# Patient Record
Sex: MALE | Race: WHITE | NOT HISPANIC OR LATINO | Employment: FULL TIME | URBAN - METROPOLITAN AREA
[De-identification: names, ages, dates, MRNs, and addresses within clinical notes are randomized per-mention and may not be internally consistent; named-entity substitution may affect disease eponyms.]

---

## 2017-03-23 ENCOUNTER — OFFICE VISIT - HEALTHEAST (OUTPATIENT)
Dept: FAMILY MEDICINE | Facility: CLINIC | Age: 47
End: 2017-03-23

## 2017-03-23 DIAGNOSIS — Z87.442 HISTORY OF KIDNEY STONES: ICD-10-CM

## 2017-03-23 DIAGNOSIS — I10 ESSENTIAL HYPERTENSION: ICD-10-CM

## 2017-03-23 DIAGNOSIS — E66.3 OVERWEIGHT: ICD-10-CM

## 2017-03-23 DIAGNOSIS — Z00.00 WELL ADULT EXAM: ICD-10-CM

## 2017-03-23 DIAGNOSIS — L70.9 ACNE: ICD-10-CM

## 2017-03-23 LAB
CHOLEST SERPL-MCNC: 214 MG/DL
FASTING STATUS PATIENT QL REPORTED: YES
HDLC SERPL-MCNC: 64 MG/DL
LDLC SERPL CALC-MCNC: 111 MG/DL
PSA SERPL-MCNC: 0.5 NG/ML (ref 0–2.5)
TRIGL SERPL-MCNC: 196 MG/DL

## 2017-03-23 ASSESSMENT — MIFFLIN-ST. JEOR: SCORE: 1540.62

## 2018-05-24 ENCOUNTER — COMMUNICATION - HEALTHEAST (OUTPATIENT)
Dept: FAMILY MEDICINE | Facility: CLINIC | Age: 48
End: 2018-05-24

## 2018-06-21 ENCOUNTER — COMMUNICATION - HEALTHEAST (OUTPATIENT)
Dept: FAMILY MEDICINE | Facility: CLINIC | Age: 48
End: 2018-06-21

## 2018-06-25 ENCOUNTER — OFFICE VISIT - HEALTHEAST (OUTPATIENT)
Dept: FAMILY MEDICINE | Facility: CLINIC | Age: 48
End: 2018-06-25

## 2018-06-25 DIAGNOSIS — L70.9 ACNE: ICD-10-CM

## 2018-06-25 DIAGNOSIS — Z12.12 ENCOUNTER FOR COLORECTAL CANCER SCREENING: ICD-10-CM

## 2018-06-25 DIAGNOSIS — E78.1 HYPERTRIGLYCERIDEMIA: ICD-10-CM

## 2018-06-25 DIAGNOSIS — I10 ESSENTIAL HYPERTENSION: ICD-10-CM

## 2018-06-25 DIAGNOSIS — Z12.11 ENCOUNTER FOR COLORECTAL CANCER SCREENING: ICD-10-CM

## 2018-06-25 DIAGNOSIS — Z00.00 ENCOUNTER FOR GENERAL ADULT MEDICAL EXAMINATION WITHOUT ABNORMAL FINDINGS: ICD-10-CM

## 2018-06-25 LAB
ANION GAP SERPL CALCULATED.3IONS-SCNC: 9 MMOL/L (ref 5–18)
BUN SERPL-MCNC: 15 MG/DL (ref 8–22)
CALCIUM SERPL-MCNC: 9.9 MG/DL (ref 8.5–10.5)
CHLORIDE BLD-SCNC: 107 MMOL/L (ref 98–107)
CHOLEST SERPL-MCNC: 190 MG/DL
CO2 SERPL-SCNC: 26 MMOL/L (ref 22–31)
CREAT SERPL-MCNC: 0.87 MG/DL (ref 0.7–1.3)
FASTING STATUS PATIENT QL REPORTED: YES
GFR SERPL CREATININE-BSD FRML MDRD: >60 ML/MIN/1.73M2
GLUCOSE BLD-MCNC: 101 MG/DL (ref 70–125)
HDLC SERPL-MCNC: 59 MG/DL
LDLC SERPL CALC-MCNC: 103 MG/DL
POTASSIUM BLD-SCNC: 5.1 MMOL/L (ref 3.5–5)
SODIUM SERPL-SCNC: 142 MMOL/L (ref 136–145)
TRIGL SERPL-MCNC: 142 MG/DL

## 2018-06-25 ASSESSMENT — MIFFLIN-ST. JEOR: SCORE: 1544.07

## 2018-08-02 ENCOUNTER — RECORDS - HEALTHEAST (OUTPATIENT)
Dept: ADMINISTRATIVE | Facility: OTHER | Age: 48
End: 2018-08-02

## 2018-08-13 ENCOUNTER — COMMUNICATION - HEALTHEAST (OUTPATIENT)
Dept: FAMILY MEDICINE | Facility: CLINIC | Age: 48
End: 2018-08-13

## 2019-09-17 ENCOUNTER — OFFICE VISIT - HEALTHEAST (OUTPATIENT)
Dept: FAMILY MEDICINE | Facility: CLINIC | Age: 49
End: 2019-09-17

## 2019-09-17 DIAGNOSIS — I10 ESSENTIAL HYPERTENSION: ICD-10-CM

## 2019-09-17 DIAGNOSIS — Z13.220 SCREENING CHOLESTEROL LEVEL: ICD-10-CM

## 2019-09-17 DIAGNOSIS — Z00.00 ENCOUNTER FOR GENERAL ADULT MEDICAL EXAMINATION WITHOUT ABNORMAL FINDINGS: ICD-10-CM

## 2019-09-17 DIAGNOSIS — Z12.5 SCREENING FOR PROSTATE CANCER: ICD-10-CM

## 2019-09-17 DIAGNOSIS — L70.0 ACNE VULGARIS: ICD-10-CM

## 2019-09-17 LAB
ANION GAP SERPL CALCULATED.3IONS-SCNC: 8 MMOL/L (ref 5–18)
BUN SERPL-MCNC: 13 MG/DL (ref 8–22)
CALCIUM SERPL-MCNC: 10.1 MG/DL (ref 8.5–10.5)
CHLORIDE BLD-SCNC: 104 MMOL/L (ref 98–107)
CHOLEST SERPL-MCNC: 199 MG/DL
CO2 SERPL-SCNC: 30 MMOL/L (ref 22–31)
CREAT SERPL-MCNC: 0.89 MG/DL (ref 0.7–1.3)
FASTING STATUS PATIENT QL REPORTED: YES
GFR SERPL CREATININE-BSD FRML MDRD: >60 ML/MIN/1.73M2
GLUCOSE BLD-MCNC: 95 MG/DL (ref 70–125)
HDLC SERPL-MCNC: 60 MG/DL
LDLC SERPL CALC-MCNC: 105 MG/DL
POTASSIUM BLD-SCNC: 4.6 MMOL/L (ref 3.5–5)
PSA SERPL-MCNC: 0.5 NG/ML (ref 0–3.5)
SODIUM SERPL-SCNC: 142 MMOL/L (ref 136–145)
TRIGL SERPL-MCNC: 169 MG/DL

## 2019-09-17 RX ORDER — TRETINOIN 1 MG/G
CREAM TOPICAL AT BEDTIME
Qty: 45 G | Refills: 3 | Status: SHIPPED | OUTPATIENT
Start: 2019-09-17 | End: 2022-02-08

## 2019-09-17 ASSESSMENT — MIFFLIN-ST. JEOR: SCORE: 1592.45

## 2021-02-08 ENCOUNTER — COMMUNICATION - HEALTHEAST (OUTPATIENT)
Dept: FAMILY MEDICINE | Facility: CLINIC | Age: 51
End: 2021-02-08

## 2021-05-30 VITALS — HEIGHT: 67 IN | BODY MASS INDEX: 25.33 KG/M2 | WEIGHT: 161.38 LBS

## 2021-06-01 VITALS — HEIGHT: 67 IN | WEIGHT: 163.19 LBS | BODY MASS INDEX: 25.61 KG/M2

## 2021-06-01 NOTE — PATIENT INSTRUCTIONS - HE
Firearm Safety  From 2005 to 2014, roughly 20,000 [place] minors were killed or seriously injured in accidental shootings; the majority of those killed in these tragic accidents were aged 12 or younger. As such the American Academy of Pediatrics, American Academy of Family Physicians, American College of Physicians and the National Rifle Association all recommend preventing unintentional use of firearms by securely storing firearms in locked safe or at minimum a trigger lock.

## 2021-06-03 VITALS
OXYGEN SATURATION: 98 % | RESPIRATION RATE: 14 BRPM | SYSTOLIC BLOOD PRESSURE: 128 MMHG | DIASTOLIC BLOOD PRESSURE: 68 MMHG | WEIGHT: 172.8 LBS | HEIGHT: 67 IN | HEART RATE: 68 BPM | BODY MASS INDEX: 27.12 KG/M2

## 2021-06-09 NOTE — PROGRESS NOTES
Assessment:      Healthy male exam.    1. Well adult exam  Lipid Cascade    PSA (Prostatic-Specific Antigen), Annual Screen   2. Essential hypertension  Basic Metabolic Panel   3. Acne     4. History of kidney stones     5. Overweight       The following high BMI interventions were performed this visit: encouragement to exercise     Plan:      I encouraged him to continue exercising and eating a healthy diet.  We are going to check the above labs today.  His blood pressure and acne are currently well controlled on his medications.  He was given refills today.     All questions answered.  Await PSA results.  Discussed healthy lifestyle modifications.  Follow up as needed.     Subjective:      oJno Bearden is a 46 y.o. male who presents for an annual exam. The patient reports that there is not domestic violence in his life.  He denies having concerns regarding hearing, vision, urination, bowel movements, sleep or mood.  He has a history of hypertension which is well controlled with low-dose of lisinopril.  He reports being on a higher dose, but his blood pressure improved with weight loss.  He also has a history of acne which is well controlled with tretinoin.  He describes having a history of microscopic hematuria that was worked up through urology and felt to be secondary to kidney stones.    Social history: Single, no children.  He works as a .      Healthy Habits:   Regular Exercise: Yes  Sunscreen Use: Yes  Healthy Diet: Yes  Dental Visits Regularly: Yes  Seat Belt: Yes  Sexually active: No  Monthly Self Testicular Exams:  No  Hemoccults: No  Flex Sig: No  Colonoscopy: No  Lipid Profile: Yes last year per pt.  Glucose Screen: yes  Prevention of Osteoporosis: No  Last Dexa: N/A  Guns at Home:  N/A      Immunization History   Administered Date(s) Administered     Influenza, inj, historic 01/05/2016     Td, historic 08/22/2005     Tdap 06/30/2014     Immunization status: up to date and documented.    No  "exam data present    Current Outpatient Prescriptions   Medication Sig Dispense Refill     lisinopril (PRINIVIL,ZESTRIL) 5 MG tablet Take 1 tablet (5 mg total) by mouth daily. 90 tablet 3     tretinoin (RETIN-A) 0.1 % cream Apply topically bedtime. 45 g 3     No current facility-administered medications for this visit.      No past medical history on file.  No past surgical history on file.  Ampicillin  Family History   Problem Relation Age of Onset     Dementia Mother      Hypertension Mother      No Medical Problems Father      Breast cancer Maternal Aunt      Breast cancer Maternal Grandmother      Heart disease Maternal Grandfather      Heart disease Paternal Grandfather      Social History     Social History     Marital status: Single     Spouse name: N/A     Number of children: N/A     Years of education: N/A     Occupational History     Not on file.     Social History Main Topics     Smoking status: Never Smoker     Smokeless tobacco: Never Used     Alcohol use Yes      Comment: moderate     Drug use: No     Sexual activity: Not on file     Other Topics Concern     Not on file     Social History Narrative     No narrative on file       Review of Systems  General:  Denies problem  Eyes: Denies problem  Ears/Nose/Throat: Denies problem  Cardiovascular: Denies problem  Respiratory:  Denies problem  Gastrointestinal:  Denies problem  Genitourinary: Denies problem  Musculoskeletal:  Denies problem  Skin: Denies problem  Neurologic: Denies problem  Psychiatric: Denies problem  Endocrine: Denies problem  Heme/Lymphatic: Denies problem   Allergic/Immunologic: Denies problem        Objective:     Vitals:    03/23/17 0801   BP: 114/76   Pulse: 64   Resp: 16   Temp: 97.9  F (36.6  C)   TempSrc: Oral   Weight: 161 lb 6 oz (73.2 kg)   Height: 5' 7\" (1.702 m)     Body mass index is 25.27 kg/(m^2).    Physical  General Appearance: Alert, cooperative, no distress, appears stated age  Head: Normocephalic, without obvious " abnormality, atraumatic  Eyes: PERRL, conjunctiva/corneas clear, EOM's intact  Ears: Normal TM's and external ear canals, both ears  Nose: Nares normal, septum midline,mucosa normal, no drainage  Throat: Lips, mucosa, and tongue normal; teeth and gums normal  Neck: Supple, symmetrical, trachea midline, no adenopathy;  thyroid: not enlarged, symmetric, no tenderness/mass/nodules  Back: Symmetric, no curvature, ROM normal, no CVA tenderness  Lungs: Clear to auscultation bilaterally, respirations unlabored  Heart: Regular rate and rhythm, S1 and S2 normal, no murmur, rub, or gallop, organomegaly  Genitourinary: Penis normal. Right testis is descended. Left testis is descended.  no hernias palpated  Abdomen: Soft, non-tender, bowel sounds active all four quadrants,  no masses  Musculoskeletal: Normal range of motion. No joint swelling or deformity.   Extremities: Extremities normal, atraumatic, no cyanosis or edema  Skin: Skin color, texture, turgor normal, no rashes.  There are a few benign-appearing moles scattered on his back.  Lymph nodes: Cervical, supraclavicular, and axillary nodes normal  Neurologic: He is alert. He has normal reflexes.   Psychiatric: He has a normal mood and affect.

## 2021-06-15 PROBLEM — Z87.442 HISTORY OF KIDNEY STONES: Status: ACTIVE | Noted: 2017-03-23

## 2021-06-15 PROBLEM — L70.9 ACNE: Status: ACTIVE | Noted: 2017-03-23

## 2021-06-15 PROBLEM — I10 ESSENTIAL HYPERTENSION: Status: ACTIVE | Noted: 2017-03-23

## 2021-06-15 PROBLEM — E66.3 OVERWEIGHT: Status: ACTIVE | Noted: 2017-03-23

## 2021-06-18 NOTE — PROGRESS NOTES
MALE PREVENTATIVE EXAM    Assessment and Plan:       1. Encounter for general adult medical examination without abnormal findings  He appears to be in good health.  I encouraged him to keep exercising and eating a healthy diet.    2. Essential hypertension  Currently very well controlled on low-dose lisinopril.  We decided to stop this medication.  He will continue to monitor his blood pressure in the community.  If the systolic blood pressure starts running in the 130s or higher he will return to the clinic for reevaluation.  - Basic Metabolic Panel    3. Acne  Stable on tretinoin    4. Hypertriglyceridemia  Discussed dietary modifications that can help with this.  - Lipid Cascade    5. Encounter for colorectal cancer screening  - Cologuard     Next follow up:  No Follow-up on file.    Immunization Review  Adult Imm Review: No immunizations due today      I discussed the following with the patient:   Adult Healthy Living: Importance of regular exercise  Healthy nutrition        Subjective:   Chief Complaint: Jono Bearden is an 47 y.o. male here for a preventative health visit.     HPI: He denies having any concerns regarding hearing, vision, urination, bowel movements, sleep or mood.  He is a history of hypertension and has been on lisinopril.  Last year we cut the dose down to 5 mg daily because his blood pressure was under good control as he has been losing weight.  He has lost about 20 pounds in the past 3 years.  When he checks his blood pressure in the community his systolic is usually in the low 100s.  He is trying to eat healthy diet and get regular exercise.  He continues to use tretinoin for acne.  Last year his triglycerides were elevated at 196, .  He had a normal BMP and PSA.    Social history: Single, no children.  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 No  Do you typically exercising at least 40 min, 3-4 times per week?  Yes  Do you  "usually eat at least 4 servings of fruit and vegetables a day, include whole grains and fiber and avoid regularly eating high fat foods? Yes  Have you had an eye exam in the past two years? NO  Do you see a dentist twice per year? Yes  Do you have any concerns regarding sleep? No    Safety Screen  If you own firearms, are they secured in a locked gun cabinet or with trigger locks? The patient does not own any firearms  Do you feel you are safe where you are living?: Yes (6/25/2018  7:57 AM)  Do you feel you are safe in your relationship(s)?: Yes (6/25/2018  7:57 AM)    Review of Systems:  Please see above.  The rest of the review of systems are negative for all systems.     Cancer Screening     Patient has no health maintenance due at this time              History     Reviewed By Date/Time Sections Reviewed    Shantel Ortiz CMA 6/25/2018  7:57 AM Tobacco            Objective:   Vital Signs:   Visit Vitals     /72 (Patient Site: Left Arm, Patient Position: Sitting, Cuff Size: Adult Regular)     Pulse (!) 52     Temp 98.5  F (36.9  C) (Oral)     Resp 16     Ht 5' 6.7\" (1.694 m)     Wt 163 lb 3 oz (74 kg)     BMI 25.79 kg/m2          PHYSICAL EXAM  General Appearance: Alert, cooperative, no distress, appears stated age  Head: Normocephalic, without obvious abnormality, atraumatic  Eyes: PERRL, conjunctiva/corneas clear, EOM's intact  Ears: Normal TM's and external ear canals, both ears  Nose: Nares normal, septum midline,mucosa normal, no drainage  Throat: Lips, mucosa, and tongue normal; teeth and gums normal  Neck: Supple, symmetrical, trachea midline, no adenopathy;  thyroid: not enlarged, symmetric, no tenderness/mass/nodules  Back: Symmetric, no curvature, ROM normal, no CVA tenderness  Lungs: Clear to auscultation bilaterally, respirations unlabored  Heart: Regular rate and rhythm, S1 and S2 normal, no murmur, rub, or gallop,  Abdomen: Soft, non-tender, bowel sounds active all four quadrants,  no " masses, no organomegaly  Musculoskeletal: Normal range of motion. No joint swelling or deformity.   Extremities: Extremities normal, atraumatic, no cyanosis or edema  Skin: Skin color, texture, turgor normal, no rashes or lesions  Lymph nodes: Cervical, supraclavicular, and axillary nodes normal  Neurologic: He is alert.  Normal speech.  No focal deficits.  Normal deep tendon reflexes.   Psychiatric: He has a normal mood and affect.              Medication List          These changes are accurate as of 6/25/18  8:28 AM.  If you have any questions, ask your nurse or doctor.               START taking these medications          tretinoin 0.1 % cream   Also known as:  RETIN-A   INSTRUCTIONS:  Apply topically at bedtime.   Started by:  Pilo Godwin, DO             STOP taking these medications          lisinopril 5 MG tablet   Also known as:  PRINIVIL,ZESTRIL   Stopped by:  Pilo Godwin, DO            Where to Get Your Medications      You can get these medications from any pharmacy     Bring a paper prescription for each of these medications      tretinoin 0.1 % cream             Additional Screenings Completed Today:

## 2021-06-27 ENCOUNTER — HEALTH MAINTENANCE LETTER (OUTPATIENT)
Age: 51
End: 2021-06-27

## 2021-06-28 NOTE — PROGRESS NOTES
Progress Notes by Pilo Colbert DO at 9/17/2019 10:40 AM     Author: Pilo Colbert DO Service: -- Author Type: Physician    Filed: 9/17/2019 11:15 AM Encounter Date: 9/17/2019 Status: Signed    : Pilo Colbert DO (Physician)       MALE PREVENTATIVE EXAM    Assessment and Plan:       1. Encounter for general adult medical examination without abnormal findings  I encouraged him to get regular exercise and to eat a healthy diet.  We are going to check fasting labs today.    2. Acne vulgaris  Controlled with tretinoin.  Refills were given.  - tretinoin (RETIN-A) 0.1 % cream; Apply topically at bedtime.  Dispense: 45 g; Refill: 3    3. Essential hypertension  His blood pressure is well controlled since stopping lisinopril last year.  He will continue to get regular exercise and eat a healthy diet.  - Basic Metabolic Panel    4. Screening cholesterol level  - Lipid Cascade    5. Screening for prostate cancer  - PSA (Prostatic-Specific Antigen), Annual Screen     Next follow up:  No follow-ups on file.    Immunization Review  Adult Imm Review: Due today, orders placed      I discussed the following with the patient:   Adult Healthy Living: Importance of regular exercise  Healthy nutrition        Subjective:   Chief Complaint: Jono Bearden is an 49 y.o. male here for a preventative health visit.     HPI: He denies concerns regarding hearing, vision, urination, bowel movements, sleep or mood.  Last year we stopped lisinopril since his blood pressure has been under such good control.  He continues to exercise regularly and eat healthy foods.  Last year his potassium level was slightly elevated at 5.1.  This could have been from the lisinopril which he is no longer on.  His LDL cholesterol was 103.    Social history: Single.  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    Healthy Habits  Are you taking a daily aspirin? No  Do you typically exercising at least 40 min,  3-4 times per week?  NO  Do you usually eat at least 4 servings of fruit and vegetables a day, include whole grains and fiber and avoid regularly eating high fat foods? Yes  Have you had an eye exam in the past two years? NO  Do you see a dentist twice per year? Yes  Do you have any concerns regarding sleep? No    Safety Screen  If you own firearms, are they secured in a locked gun cabinet or with trigger locks? NO  Do you feel you are safe where you are living?: Yes (9/17/2019 10:34 AM)  Do you feel you are safe in your relationship(s)?: Yes (9/17/2019 10:34 AM)      Review of Systems:  Please see above.  The rest of the review of systems are negative for all systems.     Cancer Screening       Status Date      COLOGUARD Next Due 8/3/2021      Done 8/3/2018 Negative     Patient has more history with this topic...              History     Reviewed By Date/Time Sections Reviewed    Odilia Prado LPN 9/17/2019 10:34 AM Tobacco            Objective:   Vital Signs: There were no vitals taken for this visit.       PHYSICAL EXAM  General Appearance: Alert, cooperative, no distress, appears stated age  Head: Normocephalic, without obvious abnormality, atraumatic  Eyes: PERRL, conjunctiva/corneas clear, EOM's intact  Ears: Normal TM's and external ear canals, both ears  Nose: Nares normal, septum midline,mucosa normal, no drainage  Throat: Lips, mucosa, and tongue normal; teeth and gums normal  Neck: Supple, symmetrical, trachea midline, no adenopathy;  thyroid: not enlarged, symmetric, no tenderness/mass/nodules  Back: Symmetric, no curvature, ROM normal, no CVA tenderness  Lungs: Clear to auscultation bilaterally, respirations unlabored  Heart: Regular rate and rhythm, S1 and S2 normal, no murmur, rub, or gallop,  Abdomen: Soft, non-tender, bowel sounds active all four quadrants,  no masses, no organomegaly  Musculoskeletal: Normal range of motion. No joint swelling or deformity.   Extremities: Extremities normal,  atraumatic, no cyanosis or edema  Skin: Skin color, texture, turgor normal, no rashes or lesions  Lymph nodes: Cervical, supraclavicular, and axillary nodes normal  Neurologic: He is alert.  Normal speech.  No focal deficits.  Normal deep tendon reflexes.   Psychiatric: He has a normal mood and affect.                Medication List           Accurate as of 9/17/19 11:15 AM. If you have any questions, ask your nurse or doctor.               CONTINUE taking these medications    tretinoin 0.1 % cream  Also known as:  RETIN-A  INSTRUCTIONS:  Apply topically at bedtime.              Where to Get Your Medications      These medications were sent to [place] [id number] [place] [address] [address]    Phone:  [phone]     tretinoin 0.1 % cream         Additional Screenings Completed Today:

## 2021-10-17 ENCOUNTER — HEALTH MAINTENANCE LETTER (OUTPATIENT)
Age: 51
End: 2021-10-17

## 2022-01-21 ENCOUNTER — ANCILLARY PROCEDURE (OUTPATIENT)
Dept: GENERAL RADIOLOGY | Facility: CLINIC | Age: 52
End: 2022-01-21
Attending: NURSE PRACTITIONER
Payer: COMMERCIAL

## 2022-01-21 ENCOUNTER — OFFICE VISIT (OUTPATIENT)
Dept: URGENT CARE | Facility: URGENT CARE | Age: 52
End: 2022-01-21
Payer: COMMERCIAL

## 2022-01-21 VITALS
TEMPERATURE: 100 F | HEART RATE: 87 BPM | HEIGHT: 67 IN | OXYGEN SATURATION: 96 % | DIASTOLIC BLOOD PRESSURE: 94 MMHG | WEIGHT: 174 LBS | RESPIRATION RATE: 20 BRPM | SYSTOLIC BLOOD PRESSURE: 182 MMHG | BODY MASS INDEX: 27.31 KG/M2

## 2022-01-21 DIAGNOSIS — S92.354A CLOSED NONDISPLACED FRACTURE OF FIFTH METATARSAL BONE OF RIGHT FOOT, INITIAL ENCOUNTER: Primary | ICD-10-CM

## 2022-01-21 DIAGNOSIS — S90.31XA CONTUSION OF RIGHT FOOT, INITIAL ENCOUNTER: ICD-10-CM

## 2022-01-21 DIAGNOSIS — M79.671 RIGHT FOOT PAIN: ICD-10-CM

## 2022-01-21 DIAGNOSIS — I10 SEVERE UNCONTROLLED HYPERTENSION: ICD-10-CM

## 2022-01-21 LAB
ANION GAP SERPL CALCULATED.3IONS-SCNC: 5 MMOL/L (ref 3–14)
BUN SERPL-MCNC: 15 MG/DL (ref 7–30)
CALCIUM SERPL-MCNC: 9.1 MG/DL (ref 8.5–10.1)
CHLORIDE BLD-SCNC: 103 MMOL/L (ref 94–109)
CO2 SERPL-SCNC: 28 MMOL/L (ref 20–32)
CREAT SERPL-MCNC: 0.96 MG/DL (ref 0.66–1.25)
GFR SERPL CREATININE-BSD FRML MDRD: >90 ML/MIN/1.73M2
GLUCOSE BLD-MCNC: 140 MG/DL (ref 70–99)
POTASSIUM BLD-SCNC: 4.2 MMOL/L (ref 3.4–5.3)
SODIUM SERPL-SCNC: 136 MMOL/L (ref 133–144)

## 2022-01-21 PROCEDURE — 80048 BASIC METABOLIC PNL TOTAL CA: CPT | Performed by: NURSE PRACTITIONER

## 2022-01-21 PROCEDURE — 73630 X-RAY EXAM OF FOOT: CPT | Mod: RT | Performed by: RADIOLOGY

## 2022-01-21 PROCEDURE — 99215 OFFICE O/P EST HI 40 MIN: CPT | Performed by: NURSE PRACTITIONER

## 2022-01-21 PROCEDURE — 36415 COLL VENOUS BLD VENIPUNCTURE: CPT | Performed by: NURSE PRACTITIONER

## 2022-01-21 RX ORDER — LISINOPRIL 5 MG/1
5 TABLET ORAL DAILY
Qty: 30 TABLET | Refills: 0 | Status: SHIPPED | OUTPATIENT
Start: 2022-01-21 | End: 2022-03-04

## 2022-01-21 ASSESSMENT — MIFFLIN-ST. JEOR: SCORE: 1592.89

## 2022-01-21 NOTE — PROGRESS NOTES
Chief Complaint   Patient presents with     Urgent Care     Foot Injury     R foot injury today at [facility] noon heard a pop sound         ICD-10-CM    1. Closed nondisplaced fracture of fifth metatarsal bone of right foot, initial encounter  S92.354A Crutches Order for DME - ONLY FOR DME   2. Severe uncontrolled hypertension  I10 Basic metabolic panel  (Ca, Cl, CO2, Creat, Gluc, K, Na, BUN)     lisinopril (ZESTRIL) 5 MG tablet     Basic metabolic panel  (Ca, Cl, CO2, Creat, Gluc, K, Na, BUN)   3. Right foot pain  M79.671 XR Foot Right G/E 3 Views   4. Contusion of right foot, initial encounter  S90.31XA XR Foot Right G/E 3 Views     Patient was on blood pressure medications until approximately 2 years ago when his primary care provider took him off the medication because his blood pressure had normalized.  He was taking lisinopril at the time and tolerated this medication well.  We will check his basic metabolic panel today to be sure his kidney function is okay.  Patient is instructed not to start lisinopril until he receives verification of his normal labs.  He will check blood pressure twice a day and keep a log of this for his primary care provider if his systolic blood pressure goes over 200 again or diastolic over 120 he is instructed to go to the emergency room.  He is also instructed to make a follow-up appointment with his primary care provider as soon as possible to discuss hypertension.    Patient is fit with a knee-high boot and advised no weightbearing for the next 3 weeks.  We do not have crutches available at the urgent care at this time so Rx is given for him to pick them up with the assistance of a friend.  After 3 weeks he may begin gently bearing weight and continue wearing boot for a total of 6 weeks.  He will follow-up with orthopedics if he is having continuing pain after a couple of weeks or has any other concerns.    48 minutes spent on the date of the encounter doing chart review, history and  "exam, documentation and further activities per the note    Xray - Reviewed and interpreted by me.  Right foot x-ray shows a proximal 5th metatarsal fracture with minimal displacement.    Subjective     Jono Bearden is an 51 year old male who presents to clinic today for right foot pain since slipping off a step and coming down onto the outside of the foot, near the 5th metacartarsal.       ROS: 10 point ROS neg other than the symptoms noted above in the HPI.       Objective    BP (!) 182/94   Pulse 87   Temp 100  F (37.8  C) (Oral)   Resp 20   Ht 1.702 m (5' 7\")   Wt 78.9 kg (174 lb)   SpO2 96%   BMI 27.25 kg/m    Nurses notes and VS have been reviewed.    Physical Exam       GENERAL APPEARANCE: healthy appearing, alert     RESP: lungs clear to auscultation - no rales, rhonchi or wheezes     CV: regular rates and rhythm, no murmurs, rubs, or gallop     MS: extremities normal- no gross deformities noted; normal muscle tone, except for tenderness over the right 5th metatarsal area which also has slight ecchymosis and swelling.     SKIN: no suspicious lesions or rashes     NEURO: Normal strength and tone, mentation intact and speech normal     PSYCH: normal thought process; no significant mood disturbance    Patient Instructions   Wear boot with no weight bearing for 3 weeks, then may start weight bearing. Wear boot for a total of 6 weeks.    Acetaminophen (Tylenol) may be taken up to 4000mg daily (3000mg if over 65) which would be 2 regular strength tablets (325mg) or two extra strength tablets(500mg) up to 4 times a day (3 times a day if over 65).   Check for acetaminophen in other OTC or prescription medications and be sure you add this in the maximum amount you take every day.    Too much acetaminophen can lead to serious liver damage. DO NOT TAKE if you have a history of liver disease.    Ibuprofen 200mg tablets may be taken up to 4 pills 4 times a day(three times a day if over 65)  to the maximum of " 3200mg,  (2400mg if >65)  daily as needed for pain.   Take with food.  Don't take with aspirin, Aleve or other antiinflammatory medication or with warfarin. DO NOT TAKE if you have a history of kidney disease.    Ice for 20 minutes every couple hours when able, keep foot elevated is much as possible.      Patient Education     Understanding Fifth Metatarsal Fracture    A fifth metatarsal fracture is a type of broken bone in your foot. You have 5 metatarsals. They are the middle bones in your feet, between your toes and your ankle bones (tarsals). The fifth metatarsal connects your smallest toe to your ankle. These bones help with arch support and balance.   How to say it   [id number]  What causes a fifth metatarsal fracture?   A direct blow to the bone is often the cause of a fracture of the fifth metatarsal. That may happen if you drop a heavy object on your foot or land wrong on your foot or ankle. Twisting activities can also break the bone. Pivoting while playing basketball is one example.   Repeatedly placing too much stress on the bone can also cause a fracture of the fifth metatarsal. This is called a stress fracture. People who do physical activities like dancing or running tend to be more prone to stress fractures.   Symptoms of a fifth metatarsal fracture   Sudden pain along the outside of your foot is the main symptom. A stress fracture may develop more slowly. You may feel chronic pain for a period of time. Your foot may also swell up and bruise. You may have trouble walking.   Treatment for a fifth metatarsal fracture   Treatment for this type of fracture depends on where the bone is broken and how severe the break is. Healing can take up to several months. Treatment may include:     Cold therapy. Putting ice on the area may reduce swelling and pain, especially in the first few days after injury.    Elevation. Propping up the foot so it's above the level of your heart may ease  swelling.    Prescription or over-the-counter pain medicines. These help reduce pain and swelling. Talk with your healthcare provider before taking any.    Immobilization. Devices such as a splint, cast, or walking boot can protect the bone and ease pain. They can help keep the bone in place so it heals properly. You may need to avoid putting any weight on the broken bone for a period of time. Severe fractures usually need a longer limit on weight-bearing activities.    Stretching and strengthening exercises. Certain exercises can help you regain flexibility and strength in your foot.    Surgery. You usually won't need surgery. But you may need it if the bone is broken into 2 or more pieces and is not aligned (displaced), doesn t heal properly, or takes a long time to heal.  Possible complications of a fifth metatarsal fracture     The bone doesn t heal correctly    Acute compartment syndrome. This is when pressure builds up in the muscles of the foot and affects blood flow.    When to call your healthcare provider  Call your healthcare provider right away if you have any of these:     Fever of 100.4 F (38 C) or higher, or as directed by your provider    Chills    Symptoms that don t get better, or get worse    Numbness or coldness in your foot    Toe nails that turn blue or grey in color    New symptoms  [employer] last reviewed this educational content on 8/1/2020 2000-2021 [employer]. All rights reserved. This information is not intended as a substitute for professional medical care. Always follow your healthcare professional's instructions.           Patient Education     Controlling High Blood Pressure   High blood pressure (hypertension) is often called the silent killer. This is because many people who have it, don t know it. It can be very dangerous. High blood pressure can raise your risk of heart attack, stroke, heart disease, and heart failure. Controlling your blood pressure can decrease  your risk of these problems. It's important to know the appropriate blood pressure range and remember to check your blood pressure regularly. Doing so can save your life.   Blood pressure measurements are given as 2 numbers. Systolic blood pressure is the upper number. This is the pressure when the heart contracts. Diastolic blood pressure is the lower number. This is the pressure when the heart relaxes between beats.   Blood pressure is categorized as normal, elevated, or stage 1 or stage 2 high blood pressure:     Normal blood pressure is systolic of less than 120 and diastolic of less than 80 (120/80)    Elevated blood pressure is systolic of 120 to 129 and diastolic less than 80    Stage 1 high blood pressure is systolic of 130 to 139 or diastolic between 80 to 89    Stage 2 high blood pressure is when systolic is 140 or higher or the diastolic is 90 or higher  A heart-healthy lifestyle can help you control your blood pressure without medicines. Here are some things you can do to pursue a heart-healthy lifestyle:     Choose heart-healthy foods     Select low-salt, low-fat foods. Limit sodium intake to 2,400 mg per day or the amount suggested by your healthcare provider.    Limit canned, dried, cured, packaged, and fast foods. These can contain a lot of salt.    Eat 8 to 10 servings of fruits and vegetables every day.    Choose lean meats, fish, or chicken.    Eat whole-grain pasta, brown rice, and beans.    Eat 2 to 3 servings of low-fat or fat-free dairy products.    Ask your doctor about the DASH eating plan. This plan helps reduce blood pressure.    When you go to a restaurant, ask that your meal be prepared with no added salt.    Stay at a healthy weight     Ask your healthcare provider how many calories to eat a day. Then stick to that number.    Ask your healthcare provider what weight range is healthiest for you. If you are overweight, a weight loss of only 3% to 5% of your body weight can help lower  blood pressure. Generally, a good weight loss goal is to lose 10% of your body weight in a year.    Limit snacks and sweets.    Get regular exercise.    Get up and get active     Find activities you enjoy that can be done alone or with friends or family. Such activities might include bicycling, dancing, walking, or jogging.    Park farther away from building entrances to walk more.    Use stairs instead of the elevator.    When you can, walk or bike instead of driving.    [place] leaves, garden, or do household repairs.    Be active at a moderate to vigorous level of physical activity for at least 40 minutes for a minimum of 3 to 4 days a week.     Manage stress     Make time to relax and enjoy life. Find time to laugh.    Communicate your concerns with your loved ones and your healthcare provider.    Visit with family and friends, and keep up with hobbies.    Limit alcohol and quit smoking     Men should have no more than 2 drinks per day.    Women should have no more than 1 drink per day.    Talk with your healthcare provider about quitting smoking. Smoking significantly increases your risk for heart disease and stroke. Ask your healthcare provider about community smoking cessation programs and other options.    Medicines  If lifestyle changes aren t enough, your healthcare provider may prescribe high blood pressure medicine. Take all medicines as prescribed. If you have any questions about your medicines, ask your healthcare provider before stopping or changing them.   [employer] last reviewed this educational content on 6/1/2019 2000-2021 [employer]. All rights reserved. This information is not intended as a substitute for professional medical care. Always follow your healthcare professional's instructions.               SUZETTE Arenas, CNP  [facility] Provider    The use of Dragon/[employer] dictation services may have been used to construct the content in this note; any grammatical or  spelling errors are non-intentional. Please contact the author of this note directly if you are in need of any clarification.

## 2022-01-22 NOTE — PATIENT INSTRUCTIONS
Wear boot with no weight bearing for 3 weeks, then may start weight bearing. Wear boot for a total of 6 weeks.    Acetaminophen (Tylenol) may be taken up to 4000mg daily (3000mg if over 65) which would be 2 regular strength tablets (325mg) or two extra strength tablets(500mg) up to 4 times a day (3 times a day if over 65).   Check for acetaminophen in other OTC or prescription medications and be sure you add this in the maximum amount you take every day.    Too much acetaminophen can lead to serious liver damage. DO NOT TAKE if you have a history of liver disease.    Ibuprofen 200mg tablets may be taken up to 4 pills 4 times a day(three times a day if over 65)  to the maximum of 3200mg,  (2400mg if >65)  daily as needed for pain.   Take with food.  Don't take with aspirin, Aleve or other antiinflammatory medication or with warfarin. DO NOT TAKE if you have a history of kidney disease.    Ice for 20 minutes every couple hours when able, keep foot elevated is much as possible.      Patient Education     Understanding Fifth Metatarsal Fracture    A fifth metatarsal fracture is a type of broken bone in your foot. You have 5 metatarsals. They are the middle bones in your feet, between your toes and your ankle bones (tarsals). The fifth metatarsal connects your smallest toe to your ankle. These bones help with arch support and balance.   How to say it   [id number]  What causes a fifth metatarsal fracture?   A direct blow to the bone is often the cause of a fracture of the fifth metatarsal. That may happen if you drop a heavy object on your foot or land wrong on your foot or ankle. Twisting activities can also break the bone. Pivoting while playing basketball is one example.   Repeatedly placing too much stress on the bone can also cause a fracture of the fifth metatarsal. This is called a stress fracture. People who do physical activities like dancing or running tend to be more prone to stress fractures.   Symptoms  of a fifth metatarsal fracture   Sudden pain along the outside of your foot is the main symptom. A stress fracture may develop more slowly. You may feel chronic pain for a period of time. Your foot may also swell up and bruise. You may have trouble walking.   Treatment for a fifth metatarsal fracture   Treatment for this type of fracture depends on where the bone is broken and how severe the break is. Healing can take up to several months. Treatment may include:     Cold therapy. Putting ice on the area may reduce swelling and pain, especially in the first few days after injury.    Elevation. Propping up the foot so it's above the level of your heart may ease swelling.    Prescription or over-the-counter pain medicines. These help reduce pain and swelling. Talk with your healthcare provider before taking any.    Immobilization. Devices such as a splint, cast, or walking boot can protect the bone and ease pain. They can help keep the bone in place so it heals properly. You may need to avoid putting any weight on the broken bone for a period of time. Severe fractures usually need a longer limit on weight-bearing activities.    Stretching and strengthening exercises. Certain exercises can help you regain flexibility and strength in your foot.    Surgery. You usually won't need surgery. But you may need it if the bone is broken into 2 or more pieces and is not aligned (displaced), doesn t heal properly, or takes a long time to heal.  Possible complications of a fifth metatarsal fracture     The bone doesn t heal correctly    Acute compartment syndrome. This is when pressure builds up in the muscles of the foot and affects blood flow.    When to call your healthcare provider  Call your healthcare provider right away if you have any of these:     Fever of 100.4 F (38 C) or higher, or as directed by your provider    Chills    Symptoms that don t get better, or get worse    Numbness or coldness in your foot    Toe nails  that turn blue or grey in color    New symptoms  [employer] last reviewed this educational content on 8/1/2020 2000-2021 [employer]. All rights reserved. This information is not intended as a substitute for professional medical care. Always follow your healthcare professional's instructions.           Patient Education     Controlling High Blood Pressure   High blood pressure (hypertension) is often called the silent killer. This is because many people who have it, don t know it. It can be very dangerous. High blood pressure can raise your risk of heart attack, stroke, heart disease, and heart failure. Controlling your blood pressure can decrease your risk of these problems. It's important to know the appropriate blood pressure range and remember to check your blood pressure regularly. Doing so can save your life.   Blood pressure measurements are given as 2 numbers. Systolic blood pressure is the upper number. This is the pressure when the heart contracts. Diastolic blood pressure is the lower number. This is the pressure when the heart relaxes between beats.   Blood pressure is categorized as normal, elevated, or stage 1 or stage 2 high blood pressure:     Normal blood pressure is systolic of less than 120 and diastolic of less than 80 (120/80)    Elevated blood pressure is systolic of 120 to 129 and diastolic less than 80    Stage 1 high blood pressure is systolic of 130 to 139 or diastolic between 80 to 89    Stage 2 high blood pressure is when systolic is 140 or higher or the diastolic is 90 or higher  A heart-healthy lifestyle can help you control your blood pressure without medicines. Here are some things you can do to pursue a heart-healthy lifestyle:     Choose heart-healthy foods     Select low-salt, low-fat foods. Limit sodium intake to 2,400 mg per day or the amount suggested by your healthcare provider.    Limit canned, dried, cured, packaged, and fast foods. These can contain a lot of  salt.    Eat 8 to 10 servings of fruits and vegetables every day.    Choose lean meats, fish, or chicken.    Eat whole-grain pasta, brown rice, and beans.    Eat 2 to 3 servings of low-fat or fat-free dairy products.    Ask your doctor about the DASH eating plan. This plan helps reduce blood pressure.    When you go to a restaurant, ask that your meal be prepared with no added salt.    Stay at a healthy weight     Ask your healthcare provider how many calories to eat a day. Then stick to that number.    Ask your healthcare provider what weight range is healthiest for you. If you are overweight, a weight loss of only 3% to 5% of your body weight can help lower blood pressure. Generally, a good weight loss goal is to lose 10% of your body weight in a year.    Limit snacks and sweets.    Get regular exercise.    Get up and get active     Find activities you enjoy that can be done alone or with friends or family. Such activities might include bicycling, dancing, walking, or jogging.    Park farther away from building entrances to walk more.    Use stairs instead of the elevator.    When you can, walk or bike instead of driving.    [place] leaves, garden, or do household repairs.    Be active at a moderate to vigorous level of physical activity for at least 40 minutes for a minimum of 3 to 4 days a week.     Manage stress     Make time to relax and enjoy life. Find time to laugh.    Communicate your concerns with your loved ones and your healthcare provider.    Visit with family and friends, and keep up with hobbies.    Limit alcohol and quit smoking     Men should have no more than 2 drinks per day.    Women should have no more than 1 drink per day.    Talk with your healthcare provider about quitting smoking. Smoking significantly increases your risk for heart disease and stroke. Ask your healthcare provider about community smoking cessation programs and other options.    Medicines  If lifestyle changes aren t enough,  your healthcare provider may prescribe high blood pressure medicine. Take all medicines as prescribed. If you have any questions about your medicines, ask your healthcare provider before stopping or changing them.   [employer] last reviewed this educational content on 6/1/2019 2000-2021 [employer]. All rights reserved. This information is not intended as a substitute for professional medical care. Always follow your healthcare professional's instructions.

## 2022-02-08 ENCOUNTER — OFFICE VISIT (OUTPATIENT)
Dept: FAMILY MEDICINE | Facility: CLINIC | Age: 52
End: 2022-02-08
Payer: COMMERCIAL

## 2022-02-08 VITALS
HEIGHT: 67 IN | SYSTOLIC BLOOD PRESSURE: 150 MMHG | TEMPERATURE: 99.5 F | WEIGHT: 179 LBS | BODY MASS INDEX: 28.09 KG/M2 | DIASTOLIC BLOOD PRESSURE: 78 MMHG | HEART RATE: 87 BPM | OXYGEN SATURATION: 99 % | RESPIRATION RATE: 16 BRPM

## 2022-02-08 DIAGNOSIS — I10 ESSENTIAL HYPERTENSION: Primary | ICD-10-CM

## 2022-02-08 DIAGNOSIS — L70.0 ACNE VULGARIS: ICD-10-CM

## 2022-02-08 DIAGNOSIS — S92.501A CLOSED FRACTURE OF PHALANX OF RIGHT FIFTH TOE, INITIAL ENCOUNTER: ICD-10-CM

## 2022-02-08 PROCEDURE — 99214 OFFICE O/P EST MOD 30 MIN: CPT | Performed by: FAMILY MEDICINE

## 2022-02-08 RX ORDER — LISINOPRIL 10 MG/1
10 TABLET ORAL DAILY
Qty: 90 TABLET | Refills: 3 | Status: SHIPPED | OUTPATIENT
Start: 2022-02-08 | End: 2023-01-25

## 2022-02-08 RX ORDER — TRETINOIN 1 MG/G
CREAM TOPICAL AT BEDTIME
Qty: 45 G | Refills: 6 | Status: SHIPPED | OUTPATIENT
Start: 2022-02-08 | End: 2024-03-04

## 2022-02-08 ASSESSMENT — MIFFLIN-ST. JEOR: SCORE: 1615.69

## 2022-02-08 NOTE — PROGRESS NOTES
ASSESSMENT and plan  1. Acne vulgaris    Refilled his medication he does work on his acne which is present mainly on his forehead he has been using this medication for many years no side effects noted.  - tretinoin (RETIN-A) 0.1 % external cream; Apply topically At Bedtime  Dispense: 45 g; Refill: 6    2. Essential hypertension    Blood pressure is elevated despite restarting his lisinopril 5 mg/day.  He cannot exercise at the time increase lisinopril to 10 mg follow-up in 2 to 3 months for annual physical.  - lisinopril (ZESTRIL) 10 MG tablet; Take 1 tablet (10 mg) by mouth daily  Dispense: 90 tablet; Refill: 3    3. Closed fracture of phalanx of right fifth toe, initial encounter    Reviewed notes from the urgent care x-rays were not available.  He needs to see orthopedics within 2 to 3 weeks and a plain radiograph can be repeated at that time no pain noted currently he can work from home  - Orthopedic  Referral; Future        Patient Instructions   It was a pleasure to meet you today in clinic    I have increased your lisinopril to 10 mg a day.  Because you have 5 mg tablets at home I would suggest you take 5 mg the morning and 5 mg in the evening until you have completed that prescription you can then start a 10 mg dosage      I refilled your Retin-A cream for your acne      I have made a referral for orthopedics they should call you within a week we will also provide them the number if they do not call you or you have no success in getting appointment please reach out to me via MyChart      Orders Placed This Encounter   Procedures     Orthopedic  Referral     Medications Discontinued During This Encounter   Medication Reason     tretinoin (RETIN-A) 0.1 % cream Reorder       No follow-ups on file.    CHIEF COMPLAINT:  chief complaint follow-up foot fracture hypertension acne    HISTORY OF PRESENT ILLNESS:  Jono is a 51 year old male who is here for a follow-up he was seen in urgent care and  "diagnosed with a displaced fifth metatarsal fracture on 1/22/2022.  He reports he has been placed in a walking boot and has not noticed any pain he has been able to work from home.  At that visit in the urgent care was found that his blood pressure was elevated again and they restarted him on lisinopril despite take the medication has been checking it at home and found that the blood pressures consistently is above 140 systolically.  He does not have a high sodium diet.  He also needs a refill of his acne medication.    REVIEW OF SYSTEMS:     10 point review of  All other systems are negative.    PFSH:  Social history reviewed    TOBACCO USE:  History   Smoking Status     Never Smoker   Smokeless Tobacco     Never Used       VITALS:  Vitals:    02/08/22 1414 02/08/22 1416   BP: (!) 158/86 (!) 150/78   Pulse: 87    Resp: 16    Temp: 99.5  F (37.5  C)    TempSrc: Oral    SpO2: 99%    Weight: 81.2 kg (179 lb)    Height: 1.702 m (5' 7.01\")      Wt Readings from Last 3 Encounters:   02/08/22 81.2 kg (179 lb)   01/21/22 78.9 kg (174 lb)   09/17/19 78.4 kg (172 lb 12.8 oz)       PHYSICAL EXAM:  Interactive male sitting comfortably exam in no acute distress  HEENT multiple small papules noted on his forehead no evidence of any comedones  Respiratory system clear to auscultation good breath sounds no wheeze no crackles  CVS regular rate and rhythm no murmurs rubs gallops appreciated  CNS cranials 2 through intact  DATA REVIEWED:  Additional History from Old Records Summarized (2): 0  Decision to Obtain Records (1): 0  Radiology Tests Summarized or Ordered (1): 0  Labs Reviewed or Ordered (1): 0  Medicine Test Summarized or Ordered (1): 0  Independent Review of EKG or X-RAY(2 each): 0    The visit lasted a total of 30 minutes .    MEDICATIONS:  Current Outpatient Medications   Medication Sig Dispense Refill     lisinopril (ZESTRIL) 10 MG tablet Take 1 tablet (10 mg) by mouth daily 90 tablet 3     lisinopril (ZESTRIL) 5 MG " tablet Take 1 tablet (5 mg) by mouth daily 30 tablet 0     tretinoin (RETIN-A) 0.1 % external cream Apply topically At Bedtime 45 g 6

## 2022-02-08 NOTE — PATIENT INSTRUCTIONS
It was a pleasure to meet you today in clinic    I have increased your lisinopril to 10 mg a day.  Because you have 5 mg tablets at home I would suggest you take 5 mg the morning and 5 mg in the evening until you have completed that prescription you can then start a 10 mg dosage      I refilled your Retin-A cream for your acne      I have made a referral for orthopedics they should call you within a week we will also provide them the number if they do not call you or you have no success in getting appointment please reach out to me via [email or web address]

## 2022-03-04 ENCOUNTER — OFFICE VISIT (OUTPATIENT)
Dept: PODIATRY | Facility: CLINIC | Age: 52
End: 2022-03-04
Attending: FAMILY MEDICINE
Payer: COMMERCIAL

## 2022-03-04 ENCOUNTER — ANCILLARY PROCEDURE (OUTPATIENT)
Dept: RADIOLOGY | Facility: CLINIC | Age: 52
End: 2022-03-04
Attending: PODIATRIST
Payer: COMMERCIAL

## 2022-03-04 VITALS — DIASTOLIC BLOOD PRESSURE: 90 MMHG | HEART RATE: 80 BPM | TEMPERATURE: 98.6 F | SYSTOLIC BLOOD PRESSURE: 142 MMHG

## 2022-03-04 DIAGNOSIS — G25.2 RESTING TREMOR: ICD-10-CM

## 2022-03-04 DIAGNOSIS — S92.351A CLOSED DISPLACED FRACTURE OF FIFTH METATARSAL BONE OF RIGHT FOOT, INITIAL ENCOUNTER: Primary | ICD-10-CM

## 2022-03-04 PROCEDURE — 99204 OFFICE O/P NEW MOD 45 MIN: CPT | Performed by: PODIATRIST

## 2022-03-04 NOTE — LETTER
3/4/2022         RE: Jono Bearden  [address] [facility] [facility] [address]        Dear Colleague,    Thank you for referring your patient, Jono Bearden, to the [facility] [place]. Please see a copy of my visit note below.          FOOT AND ANKLE SURGERY/PODIATRY CONSULT NOTE         ASSESSMENT:   5th metatarsal fracture right foot   Resting tremor       TREATMENT:  -I reviewed the patient's weight bearing x-rays from today which indicate a mildly displaced fracture of the 5th metatarsal base.     -Due to the amount of displacement and without significant bony healing at 6 weeks post-injury, I recommend ORIF with plate/screw fixation. Reviewed surgical procedure including post-op non-weight bearing for minimum of 10-12 weeks or until bony healing noted.     -Risks of surgery include infection, non-union and need for additional surgery. Also discussed non-operative treatment including use of a bone stimulator.     -I have referred him to neurology for resting tremor.     -Patient's questions invited and answered. He will contact my office with his decision to either proceed with surgery or non-operative treatment. He was encouraged to call my office with any further questions or concerns.     Donavon Ferrer DPM  [facility] Podiatry/Foot & Ankle Surgery      HPI: I was asked to see Jono Bearden today for a 5th metatarsal fracture on his right foot. Patient states is suffered this injury after falling down stairs 6 weeks ago. He was initially seen at urgent care and instructed to remain non-weight bearing x3 weeks, then walking in a CAM boot.       No past medical history on file.      Social History     Socioeconomic History     Marital status: Single     Spouse name: Not on file     Number of children: Not on file     Years of education: Not on file     Highest education level: Not on file   Occupational History     Not on file   Tobacco Use     Smoking status: Never Smoker     Smokeless  tobacco: Never Used   Substance and Sexual Activity     Alcohol use: Yes     Comment: Alcoholic Drinks/day: moderate     Drug use: No     Sexual activity: Not on file   Other Topics Concern     Not on file   Social History Narrative     Not on file     Social Determinants of Health     Financial Resource Strain: Not on file   Food Insecurity: Not on file   Transportation Needs: Not on file   Physical Activity: Not on file   Stress: Not on file   Social Connections: Not on file   Intimate Partner Violence: Not on file   Housing Stability: Not on file            Allergies   Allergen Reactions     Ampicillin Unknown     As child-unknown reaction         MEDICATIONS:   Current Outpatient Medications   Medication     lisinopril (ZESTRIL) 10 MG tablet     tretinoin (RETIN-A) 0.1 % external cream     No current facility-administered medications for this visit.        Family History   Problem Relation Age of Onset     Dementia Mother      Hypertension Mother      No Known Problems Father      Breast Cancer Maternal Aunt      Breast Cancer Maternal Grandmother      Heart Disease Maternal Grandfather      Heart Disease Paternal Grandfather           Review of Systems - 10 point Review of Systems is negative except for right foot fracture which is noted in HPI.    OBJECTIVE:  Appearance: alert, well appearing, and in no distress.    VITAL SIGNS: BP (!) 142/90   Pulse 80   Temp 98.6  F (37  C)       General appearance: Patient is alert and fully cooperative with history & exam.  No sign of distress is noted during the visit.     Psychiatric: Affect is pleasant & appropriate.  Patient appears motivated to improve health.     Respiratory: Breathing is regular & unlabored while sitting.     HEENT: Hearing is intact to spoken word.  Speech is clear.  No gross evidence of visual impairment that would impact ambulation.      Vascular: Dorsalis pedis and posterior tibial pulses are palpable. There is pedal hair growth right.  CFT <  3 sec from anterior tibial surface to distal digits right. There is no appreciable edema noted.  Dermatologic: Turgor and texture are within normal limits. No coloration or temperature changes. No primary or secondary lesions noted.  Neurologic: All epicritic and proprioceptive sensations are grossly intact right.  Musculoskeletal: Mild pain base of 5th metatarsal right foot. Resting tremor bilateral feet.             Again, thank you for allowing me to participate in the care of your patient.        Sincerely,        Donavon Ferrer DPM

## 2022-03-04 NOTE — PATIENT INSTRUCTIONS
Stay limited walking in CAM boot.  Please go back to the clinic where you received your boot and have them exchange it for a proper size.  Your toes should not hang over the edge.     An order has been placed for a rolling knee walker.  Please get from any of the [facility] medical locations prior to surgery.    Having Ankle Fracture Open Reduction and Internal Fixation (ORIF)  Open reduction and internal fixation (ORIF) is a type of treatment to fix a broken bone. It puts the pieces of a broken bone back together so they can heal. Open reduction means the bones are put back in place during a surgery. Internal fixation means that special hardware is used to hold the bone pieces together. This helps the bone heals correctly. The procedure is done by an orthopedic surgeon. This is a doctor with special training in treating bone, joint, and muscle problems.    What to tell your healthcare provider  Make sure you tell your provider about all the medicines you take. This includes prescription and over-the-counter medicines, such as aspirin. It also includes any vitamins, herbs, and other supplements you take. Tell the provider the last time you had something to eat or drink. Also tell your provider if you:    Have had any recent changes in your health, such as an infection or fever  Are sensitive or allergic to any medicines, latex, tape, or anesthetic medicines (local and general)  Are pregnant or think you may be pregnant    Tests before your surgery  You may have an X-ray or a CT scan to look at your ankle.      Getting ready for your surgery  ORIF often takes place as emergency surgery after an accident or injury. Before this procedure, a healthcare provider will ask about your health history and give you a physical exam.    In some cases, ankle fracture ORIF is planned. Your surgery may be done after the swelling in your ankle has gone down. You might need to have your ankle held in place while you wait for your  surgery. Talk with your provider about how to get ready for your surgery. You may need to stop taking some medicines, such as blood thinners and aspirin, before the procedure. If you smoke, you may need to stop before your surgery. Smoking can delay healing. Talk with your provider if you need help to stop smoking.    Also, make sure to:    Ask a family member or friend to take you home from the hospital. You can't drive yourself.  Arrange for someone to help you at home.  Follow any directions you're given for not eating or drinking before surgery.  Follow all other instructions from your provider.  You'll be asked to sign a consent form that gives your permission to do the procedure. Read the form carefully. Ask questions if something isn't clear.      On the day of surgery  Your surgeon will explain the details of your surgery. These details will depend on where your injury is and how serious it is. An orthopedic surgeon with a team of specialized nurses will do the surgery. The preparation and surgery may take a couple of hours. In general, you can expect the following:    You'll likely have general anesthesia. This is medicine to prevent pain and make you sleep through the surgery. Or you may have local (regional) anesthesia to numb the area and medicine to help you relax and sleep through the surgery.  A provider watches your vital signs, like your heart rate and blood pressure, during the surgery.  After cleaning the skin, your surgeon will make a cut (incision) through the skin and muscle of your ankle.  The surgeon will put the pieces of your ankle bones back into alignment (reduction).  The pieces of the broken bones will be secured to each other (fixation). Your surgeon may use screws, metal plates, wires, or pins.  Other repairs are made to the area as needed.  The layers of muscle and skin around your ankle will be closed with stitches (sutures) or staples.    After your surgery  Talk with your surgeon  about what you can expect after your surgery. You may go home the same day. Or you may stay overnight in the hospital. Before leaving the hospital, you'll likely have X-rays taken of your ankle. This is to check the repair.    You'll have some pain after the surgery. Your surgeon will tell you what pain medicine you can take to help reduce the pain. Don't take certain over-the-counter medicines for pain, as instructed. Some of these may interfere with bone healing. You can also use ice packs to help lessen pain and swelling. To make an ice pack, put ice cubes in a plastic bag that seals at the top. Wrap the bag in a clean, thin towel or cloth. Never put ice or an ice pack directly on the skin.    You may be told to keep your ankle raised for a period of time after your surgery. You ll also need to not move your ankle for a while. Often this means wearing a brace, cast, splint or boot, perhaps for several weeks. You ll get instructions about how to move your leg and when you can put weight on it. Your surgeon may also tell you to eat foods high in calcium and vitamin D to help with bone healing. You may need to take medicine (blood thinner) to prevent blood clots for a little while after your surgery. Follow all your surgeon s instructions carefully.      Follow-up care  Go to all of your follow-up appointments. You may need to have your stitches or staples removed a week or so after your surgery.    You may have physical therapy to improve the strength and movement of your ankle. The therapy may include treatments and exercises. The therapy improves your chances of a full recovery. Most people are able to return to all their normal activities within a few months.            Jono,    Your surgery with [facility] has been scheduled. Please read thoroughly and follow instructions.     Procedure:   ORIF right foot  Procedure Date :  Procedure time:     Surgeon:   Dr. Raphael  Nabil  Admission Type:   Outpatient  Procedure Location:   [facility]:  [address] [place] [address] [address] [place] [address] (Fax: [phone])    Covid Test: SEE APPOINTMENTS  Post Operative Appointment: SEE APPOINTMENTS you will need to get an xray of your foot prior to this appointment, please arrive 30 minutes early and report to xray prior to checking in to your appointment with us.      Preparation Instructions to complete:    []  You will need a Pre-op Physical within 30 days before surgery with your primary care provider. This exam is required by the anesthesiologist to ensure a safe surgical experience.      Failure  to obtain your pre-op physical will lead to cancellation of your surgery    Call them right away to schedule this. Please ensure your Preoperative Physical is faxed to the Hospital (numbers listed above)    [] Preoperative Medication Instructions    We would like you to stop your anticoagulation medications 3-5 days before surgery HOWEVER contact your prescribing provider for instructions before discontinuing any medications. (Examples: Coumadin, Plavix, Xarelto, Eliquis, Pradaxa, Effient, Brilinta) If you are on Coumadin, we would like the goal INR ? 1.2.  N/A    IT IS OK TO STAY ON ASPIRIN PRIOR TO SURGERY.     [] Fasting Requirements    Nothing to eat for 8 hours before surgery unless instructed differently by the surgery nurse.    You may have clear liquids up to two hours before your arrival time (coffee, tea, water, or Gatorade. No cream or milk)    If your primary care provider has instructed you to continue oral medications, you may take them on the morning of surgery with a small sip of water.      No alcohol or smoking after 12:00am the day of surgery    [] PCR COVID-19 test is required within 4 days of surgery    If your test is positive or you fail to get tested, your surgery will be postponed.     [] Contact your insurance regarding coverage    If you would like a Good  "Anais Estimate for your upcoming procedure at [facility] Location, contact Cost of Care Estimates     Advocates are available Monday through Friday 8am - 5pm   [phone]    You may also submit a request online at [email or web address]  - Complete the secure online form found under \"Services and Procedure Pricing\"     If your procedure is at [facility] please contact the numbers below for Cost of Care Estimates.   - Facility Charge: [phone]    Anesthesiology charge:  [phone]      [] DO NOT BRING FMLA WITH TO SURGERY.  These should be sent to the provider's office by fax to [phone].     [] Day of Surgery    Medications - Take as indicated with sips of water.     Wear comfortable loose fitting clothes. Wear your glasses-Not contacts. Do not wear jewelry and remove body piercing's. Surgery may be cancelled if they are not removed.     You may have 1 family member wait in the lobby during your surgery. Visitor restrictions are subject to change. Please verify with the surgery nurse when they call.     If same day surgery-Have a someone come with you to surgery that can help you understand the surgeon's instructions, drive you home and stay with you overnight the first night.    [] If the community sees a new COVID-19 surge, your procedure may need to be postponed. We will contact you if this happens.    [] You will receive a call from a surgery nurse 1-3 days prior to surgery. They will go over more details with you.             ** AFTER SURGERY INSTRUCTIONS **    [] You are to remain NON WEIGHT BEARING on your right foot NON WEIGHT BEARING MEANS NO PRESSURE ON YOUR FOOT OR HEEL AT ANY TIME FOR ANY REASON!    [] Prior to surgery you should already have a CAM BOOT which you will need to WEAR THIS AT ALL TIMES EVEN WHILE IN BED Please bring this with you on the day of surgery.  You should take your CAM boot off a few times a day for about 10 minutes to and monitor for " redness.    [] You will need to obtain a A ROLLING KNEE WALKER to help you ambulate after surgery. Please also bring this with you on the day of surgery.    [] During surgery Dr. Ferrer will apply a gauze dressing to your foot. This will remain intact until you are seen in clinic for follow up    [] It is NOT OKAY to get your surgical site wet while bathing, you will need to purchase a cast cover to protect your foot from getting wet. You can purchase this at [facility] or your local pharmacy.    [] It is important that you elevate your affected foot after surgery. Proper elevation is raising your foot above your waist. The fluid in your lower extremities needs to get back to your heart so it can get pumped to your kidneys and expelled through urination. So if you notice you have to go to the bathroom more frequently when you are elevating your leg this is a good sign that it is working.     [] It is important that you increase your protein intake after surgery. Protein is essential for wound healing. We recommend you take a protein supplement twice per day. This is in addition to your normal diet. Examples of protein supplements are Ensure, Boost, Glucerna (if you are diabetic), or protein powder. You can purchase these at your local retailer or grocery store.       Notify our office right away, if you have any changes in your health status, or if you develop a cold, flu, diarrhea, infection, fever or sore throat before your scheduled surgery date. We can be reached at [phone] Monday-Friday 8 am-4:30 pm if you have any questions.     Thank you for trusting us with your care!        Before Your Procedure or Hospital Admission  Testing for COVID-19 (Coronavirus)    Thank you for choosing [facility] for your health care needs. The COVID-19 pandemic is a very challenging time for everyone. Our goal is to keep you and our team here at [facility] safe and healthy.       Before you come in,  All patients must get tested for COVID-19. Your test needs to happen 2 to 4 days before you check in to the hospital or surgery site.    A clinic scheduler will call about a week in advance to set up a testing time at one of our labs. We ll take a swab of your nose or throat.    Note: If you go to a clinic or pharmacy like [facility] or [facility] for your test, make sure you get a test inside the nose. Tests inside the nose are:  - A naso-pharyngeal (NP) RT-PCR test  - An anterior nares RT-PCR test    Do NOT get a  rapid  test or a saliva (spit) test.    After the test, please stay at home and stay out of contact with other people. It will be harder for you to recover if you get COVID-19 before your treatment.    Please follow all current safety guidelines, including:    Limit trips outside your home.    Limit the number of people you see.    Always wear a mask outside your home.    Use social distancing. Stay 6 feet away from others whenever you can.    Wash your hands often.    If your test shows you have COVID-19  If your test is positive, we ll let you know. A positive test means that you have the virus.  We ll probably have to postpone your admission, surgery or procedure. Your doctor will discuss this with you. After that, we ll let you know what to do and when you can re-schedule.  We may need to cancel your treatment on short notice for other reasons, too.    If your test shows you DON T have COVID-19  Even if your test is negative, you can still get COVID-19. It s rare but, sometimes, the test result is wrong. You could also catch the virus after taking the test.  There s a very small chance that you could catch COVID-19 in the hospital or surgery center.    Day of your surgery or procedure    Please come wearing a face covering that covers both your nose and mouth.    When you arrive, we ll ask you some questions to find out if you have any signs or symptoms of COVID-19.    Ask your care team if you can have  visitors. All visitors must wear face coverings and will be screened for signs of COVID-19.    Even if no visitors are allowed, you can still have with you:  - Your legal guardian or legal decision maker  - A parent and one other visitor, if you are  younger than 18 years old  - A partner and a , if you are in labor    We might need to teach you about taking care of yourself after surgery. If so, a visitor can come into the hospital to learn about it, too.    The rules for visitors change often, depending on how much the virus is spreading. To learn more, see Visiting a Loved One in the Hospital during the COVID-19 Outbreak. Please call your care team, hospital or surgery center if you have any questions. We thank you for your understanding and for choosing [facility] for your care.    Questions and Answers  Does it matter where I get tested for COVID-19?  Yes. We urge you to get tested at one of our [facility] COVID-19 testing sites. We process these tests in our lab and can get the results quickly. Your [facility] care team needs to get your results before you check in.    What should I do if I can t get tested at [facility]?  You can get tested somewhere else, but you ll need to take these extra steps:  1. Contact your family doctor or clinic to arrange your test.  2. Take the test within 4 days of your surgery or procedure. We can t accept tests older than 4 days.  3. Make sure you re getting a test inside the nose.  Tests inside the nose are:  - A naso-pharyngeal (NP) RT-PCR test  - An anterior nares RT-PCR test  -Many pharmacies use  rapid  tests or saliva (spit) tests. We do NOT accept those tests before surgery or procedures. Tests from inside the nose are the most accurate tests.  4. Make sure your doctor or clinic faxes your results to [facility] at [phone].    If we don t get your results in time, we may have to delay or cancel your treatment.      For  informational purposes only. Not to replace the advice of your health care provider. Copyright   2020 [facility]. All rights reserved. Clinically reviewed by Infection Prevention and the [facility] COVID-19 Clinical Team.  [employer] 847680 - Rev 09/09/21.

## 2022-03-04 NOTE — PROGRESS NOTES
FOOT AND ANKLE SURGERY/PODIATRY CONSULT NOTE         ASSESSMENT:   5th metatarsal fracture right foot   Resting tremor       TREATMENT:  -I reviewed the patient's weight bearing x-rays from today which indicate a mildly displaced fracture of the 5th metatarsal base.     -Due to the amount of displacement and without significant bony healing at 6 weeks post-injury, I recommend ORIF with plate/screw fixation. Reviewed surgical procedure including post-op non-weight bearing for minimum of 10-12 weeks or until bony healing noted.     -Risks of surgery include infection, non-union and need for additional surgery. Also discussed non-operative treatment including use of a bone stimulator.     -I have referred him to neurology for resting tremor.     -Patient's questions invited and answered. He will contact my office with his decision to either proceed with surgery or non-operative treatment. He was encouraged to call my office with any further questions or concerns.     Donavon Ferrer DPM  [facility] Podiatry/Foot & Ankle Surgery      HPI: I was asked to see Jono Bearden today for a 5th metatarsal fracture on his right foot. Patient states is suffered this injury after falling down stairs 6 weeks ago. He was initially seen at urgent care and instructed to remain non-weight bearing x3 weeks, then walking in a CAM boot.       No past medical history on file.      Social History     Socioeconomic History     Marital status: Single     Spouse name: Not on file     Number of children: Not on file     Years of education: Not on file     Highest education level: Not on file   Occupational History     Not on file   Tobacco Use     Smoking status: Never Smoker     Smokeless tobacco: Never Used   Substance and Sexual Activity     Alcohol use: Yes     Comment: Alcoholic Drinks/day: moderate     Drug use: No     Sexual activity: Not on file   Other Topics Concern     Not on file   Social History Narrative     Not on  file     Social Determinants of Health     Financial Resource Strain: Not on file   Food Insecurity: Not on file   Transportation Needs: Not on file   Physical Activity: Not on file   Stress: Not on file   Social Connections: Not on file   Intimate Partner Violence: Not on file   Housing Stability: Not on file            Allergies   Allergen Reactions     Ampicillin Unknown     As child-unknown reaction         MEDICATIONS:   Current Outpatient Medications   Medication     lisinopril (ZESTRIL) 10 MG tablet     tretinoin (RETIN-A) 0.1 % external cream     No current facility-administered medications for this visit.        Family History   Problem Relation Age of Onset     Dementia Mother      Hypertension Mother      No Known Problems Father      Breast Cancer Maternal Aunt      Breast Cancer Maternal Grandmother      Heart Disease Maternal Grandfather      Heart Disease Paternal Grandfather           Review of Systems - 10 point Review of Systems is negative except for right foot fracture which is noted in HPI.    OBJECTIVE:  Appearance: alert, well appearing, and in no distress.    VITAL SIGNS: BP (!) 142/90   Pulse 80   Temp 98.6  F (37  C)       General appearance: Patient is alert and fully cooperative with history & exam.  No sign of distress is noted during the visit.     Psychiatric: Affect is pleasant & appropriate.  Patient appears motivated to improve health.     Respiratory: Breathing is regular & unlabored while sitting.     HEENT: Hearing is intact to spoken word.  Speech is clear.  No gross evidence of visual impairment that would impact ambulation.      Vascular: Dorsalis pedis and posterior tibial pulses are palpable. There is pedal hair growth right.  CFT < 3 sec from anterior tibial surface to distal digits right. There is no appreciable edema noted.  Dermatologic: Turgor and texture are within normal limits. No coloration or temperature changes. No primary or secondary lesions  noted.  Neurologic: All epicritic and proprioceptive sensations are grossly intact right.  Musculoskeletal: Mild pain base of 5th metatarsal right foot. Resting tremor bilateral feet.

## 2022-03-07 ENCOUNTER — TELEPHONE (OUTPATIENT)
Dept: VASCULAR SURGERY | Facility: CLINIC | Age: 52
End: 2022-03-07
Payer: COMMERCIAL

## 2022-03-07 NOTE — TELEPHONE ENCOUNTER
Patient would like to proceed with scheduling surgery with Dr. Ferrer. Would like to schedule 3/17 or after.   Call cell: [phone]

## 2022-03-08 ENCOUNTER — PREP FOR PROCEDURE (OUTPATIENT)
Dept: VASCULAR SURGERY | Facility: CLINIC | Age: 52
End: 2022-03-08
Payer: COMMERCIAL

## 2022-03-08 DIAGNOSIS — S92.351G CLOSED DISPLACED FRACTURE OF FIFTH METATARSAL BONE OF RIGHT FOOT WITH DELAYED HEALING, SUBSEQUENT ENCOUNTER: Primary | ICD-10-CM

## 2022-03-08 NOTE — TELEPHONE ENCOUNTER
Patient called again asking about scheduling for his upcoming surgery.  He is hoping to get in as soon as possible but is aware that Dr. Ferrer has some vacation time coming up.  He was hoping to get in for surgery right after Dr. Ferrer is back.      Dennys can be reached at [phone]

## 2022-03-09 ENCOUNTER — DOCUMENTATION ONLY (OUTPATIENT)
Dept: PODIATRY | Facility: CLINIC | Age: 52
End: 2022-03-09
Payer: COMMERCIAL

## 2022-03-09 DIAGNOSIS — Z11.59 ENCOUNTER FOR SCREENING FOR OTHER VIRAL DISEASES: Primary | ICD-10-CM

## 2022-03-09 DIAGNOSIS — Z20.822 COVID-19 RULED OUT: Primary | ICD-10-CM

## 2022-03-09 NOTE — PROGRESS NOTES
Surgery Scheduled    CPT: 53197    Equipment: Jacob 5th metatarsal claw plate and screws, large c-arm, dbm.    Surgery/Procedure: Open Reduction Internal Fixation, fifth metatarsal right foot     Location: [facility] Center:  [facility] [facility] [address] [place] [address] (Fax: [phone])    Date: 3/28/22    Time: 2:00PM    Admission Type: Outpatient    Surgeon: Dr. Ferrer    OR Confirmed/ :  Yes with Faye on 3/9/22    Orders In:  Yes    Post Op:  4/4/22 w/XR prior    4/18/22    Covid Scheduled: 3/24/22     Blood Thinners Addressed: N/A      Spoke w/pt regarding surgery plan.  Taylor HATHAWAY sent instructions via [employer].

## 2022-03-24 ENCOUNTER — LAB (OUTPATIENT)
Dept: LAB | Facility: CLINIC | Age: 52
End: 2022-03-24
Attending: PODIATRIST

## 2022-03-24 ENCOUNTER — OFFICE VISIT (OUTPATIENT)
Dept: FAMILY MEDICINE | Facility: CLINIC | Age: 52
End: 2022-03-24
Payer: COMMERCIAL

## 2022-03-24 VITALS
HEART RATE: 68 BPM | BODY MASS INDEX: 27.02 KG/M2 | TEMPERATURE: 98.5 F | HEIGHT: 67 IN | DIASTOLIC BLOOD PRESSURE: 84 MMHG | SYSTOLIC BLOOD PRESSURE: 132 MMHG | WEIGHT: 172.13 LBS

## 2022-03-24 DIAGNOSIS — S92.501G: ICD-10-CM

## 2022-03-24 DIAGNOSIS — Z01.818 PREOP GENERAL PHYSICAL EXAM: Primary | ICD-10-CM

## 2022-03-24 DIAGNOSIS — Z11.59 ENCOUNTER FOR SCREENING FOR OTHER VIRAL DISEASES: ICD-10-CM

## 2022-03-24 LAB
ANION GAP SERPL CALCULATED.3IONS-SCNC: 14 MMOL/L (ref 5–18)
BUN SERPL-MCNC: 16 MG/DL (ref 8–22)
CALCIUM SERPL-MCNC: 10.3 MG/DL (ref 8.5–10.5)
CHLORIDE BLD-SCNC: 102 MMOL/L (ref 98–107)
CO2 SERPL-SCNC: 24 MMOL/L (ref 22–31)
CREAT SERPL-MCNC: 1 MG/DL (ref 0.7–1.3)
ERYTHROCYTE [DISTWIDTH] IN BLOOD BY AUTOMATED COUNT: 12.2 % (ref 10–15)
GFR SERPL CREATININE-BSD FRML MDRD: 90 ML/MIN/1.73M2
GLUCOSE BLD-MCNC: 104 MG/DL (ref 70–125)
HCT VFR BLD AUTO: 49.7 % (ref 40–53)
HGB BLD-MCNC: 17.2 G/DL (ref 13.3–17.7)
MCH RBC QN AUTO: 32.9 PG (ref 26.5–33)
MCHC RBC AUTO-ENTMCNC: 34.6 G/DL (ref 31.5–36.5)
MCV RBC AUTO: 95 FL (ref 78–100)
PLATELET # BLD AUTO: 259 10E3/UL (ref 150–450)
POTASSIUM BLD-SCNC: 4.6 MMOL/L (ref 3.5–5)
RBC # BLD AUTO: 5.23 10E6/UL (ref 4.4–5.9)
SODIUM SERPL-SCNC: 140 MMOL/L (ref 136–145)
WBC # BLD AUTO: 5 10E3/UL (ref 4–11)

## 2022-03-24 PROCEDURE — 85027 COMPLETE CBC AUTOMATED: CPT | Performed by: FAMILY MEDICINE

## 2022-03-24 PROCEDURE — U0005 INFEC AGEN DETEC AMPLI PROBE: HCPCS

## 2022-03-24 PROCEDURE — 80048 BASIC METABOLIC PNL TOTAL CA: CPT | Performed by: FAMILY MEDICINE

## 2022-03-24 PROCEDURE — U0003 INFECTIOUS AGENT DETECTION BY NUCLEIC ACID (DNA OR RNA); SEVERE ACUTE RESPIRATORY SYNDROME CORONAVIRUS 2 (SARS-COV-2) (CORONAVIRUS DISEASE [COVID-19]), AMPLIFIED PROBE TECHNIQUE, MAKING USE OF HIGH THROUGHPUT TECHNOLOGIES AS DESCRIBED BY CMS-2020-01-R: HCPCS

## 2022-03-24 PROCEDURE — 99213 OFFICE O/P EST LOW 20 MIN: CPT | Performed by: FAMILY MEDICINE

## 2022-03-24 PROCEDURE — 36415 COLL VENOUS BLD VENIPUNCTURE: CPT | Performed by: FAMILY MEDICINE

## 2022-03-24 NOTE — PATIENT INSTRUCTIONS
It was a pleasure to see you in the office today Jono      Please note that since you are going for surgery you have already stopped taking all anti-inflammatories which include the use of no Motrin, Aleve, Advil, Naprosyn or aspirin.      You can of course use Tylenol if need be for pain if your pain increases after the surgical process please do not hesitate to reach me or your podiatry team.      Before the surgery you should not eat anything heavy after 7 AM you can have clear fluids such as juices from 12 to 7 AM

## 2022-03-24 NOTE — PROGRESS NOTES
[address]  [place] [address]  Phone: [phone]  Fax: [phone]  Primary Provider: Bob Howard  Pre-op Performing Provider: BOB HOWARD      PREOPERATIVE EVALUATION:  Today's date: 3/24/2022    Jono Bearden is a 51 year old male who presents for a preoperative evaluation.    Surgical Information:  Surgery/Procedure: OPEN REDUCTION INTERNAL FIXATION, fifth metatarsal right foot   Surgery Location: [facility]  Surgeon: Dr. Ferrer  Surgery Date: 038/28/2022  Time of Surgery: 12:30pm   Where patient plans to recover: At home with family  Fax number for surgical facility: Note does not need to be faxed, will be available electronically in Epic.    Type of Anesthesia Anticipated: Combined MAC with Popliteal Block     Assessment & Plan     The proposed surgical procedure is considered LOW risk.    Preop general physical exam  Patient can proceed for surgery with no other further testing required  - Basic metabolic panel  (Ca, Cl, CO2, Creat, Gluc, K, Na, BUN); Future  - CBC with platelets; Future  - Basic metabolic panel  (Ca, Cl, CO2, Creat, Gluc, K, Na, BUN)  - CBC with platelets    Closed fracture of phalanx of right fifth toe with delayed healing, subsequent encounter  Patient has no discomfort while resting.  He is not using any pain medication at this time           Risks and Recommendations:  The patient has the following additional risks and recommendations for perioperative complications:   - No identified additional risk factors other than previously addressed        RECOMMENDATION:  APPROVAL GIVEN to proceed with proposed procedure, without further diagnostic evaluation.        56}    Subjective     HPI related to upcoming procedure:   Jono returns today he has his scheduled surgery with Dr. Ferrer next week for the right fifth toe fracture.  His last x-ray showed delayed healing.  He notes no pain he has been working from home he is  already stopped using any anti-inflammatories.  He has not had any recent fever, cough, sore throat or abdominal pain.  He is already had his COVID-19 testing today    Preop Questions 3/23/2022   1. Have you ever had a heart attack or stroke? No   2. Have you ever had surgery on your heart or blood vessels, such as a stent placement, a coronary artery bypass, or surgery on an artery in your head, neck, heart, or legs? No   3. Do you have chest pain with activity? No   4. Do you have a history of  heart failure? No   5. Do you currently have a cold, bronchitis or symptoms of other infection? No   6. Do you have a cough, shortness of breath, or wheezing? No   7. Do you or anyone in your family have previous history of blood clots? No   8. Do you or does anyone in your family have a serious bleeding problem such as prolonged bleeding following surgeries or cuts? No   9. Have you ever had problems with anemia or been told to take iron pills? No   10. Have you had any abnormal blood loss such as black, tarry or bloody stools? No   11. Have you ever had a blood transfusion? UNKNOWN -    12. Are you willing to have a blood transfusion if it is medically needed before, during, or after your surgery? Yes   13. Have you or any of your relatives ever had problems with anesthesia? No   14. Do you have sleep apnea, excessive snoring or daytime drowsiness? No   15. Do you have any artifical heart valves or other implanted medical devices like a pacemaker, defibrillator, or continuous glucose monitor? No   16. Do you have artificial joints? No   17. Are you allergic to latex? No     56}        Review of Systems  CONSTITUTIONAL: NEGATIVE for fever, chills, change in weight  INTEGUMENTARY/SKIN: NEGATIVE for worrisome rashes, moles or lesions  EYES: NEGATIVE for vision changes or irritation  ENT/MOUTH: NEGATIVE for ear, mouth and throat problems  RESP: NEGATIVE for significant cough or SOB  CV: NEGATIVE for chest pain, palpitations  "or peripheral edema  GI: NEGATIVE for nausea, abdominal pain, heartburn, or change in bowel habits  : NEGATIVE for frequency, dysuria, or hematuria  MUSCULOSKELETAL: NEGATIVE for significant arthralgias or myalgia  NEURO: NEGATIVE for weakness, dizziness or paresthesias  ENDOCRINE: NEGATIVE for temperature intolerance, skin/hair changes  HEME: NEGATIVE for bleeding problems  PSYCHIATRIC: NEGATIVE for changes in mood or affect    Patient Active Problem List    Diagnosis Date Noted     Closed displaced fracture of fifth metatarsal bone of right foot, initial encounter 03/04/2022     Priority: Medium     Essential hypertension 03/23/2017     Priority: Medium     Acne 03/23/2017     Priority: Medium     History of kidney stones 03/23/2017     Priority: Medium     Overweight 03/23/2017     Priority: Medium      Past Medical History:   Diagnosis Date     Hypertension      Past Surgical History:   Procedure Laterality Date     LEG SURGERY      For a Fracture     NO PAST SURGERIES       Current Outpatient Medications   Medication Sig Dispense Refill     lisinopril (ZESTRIL) 10 MG tablet Take 1 tablet (10 mg) by mouth daily 90 tablet 3     tretinoin (RETIN-A) 0.1 % external cream Apply topically At Bedtime 45 g 6       Allergies   Allergen Reactions     Ampicillin Unknown     As child-unknown reaction        Social History     Tobacco Use     Smoking status: Never Smoker     Smokeless tobacco: Never Used   Substance Use Topics     Alcohol use: Yes     Comment: Alcoholic Drinks/day: moderate       History   Drug Use No         Objective     Ht 1.702 m (5' 7.01\")   Wt 78.1 kg (172 lb 2 oz)   BMI 26.95 kg/m      Physical Exam    GENERAL APPEARANCE: healthy, alert and no distress     EYES: EOMI,  PERRL     HENT: ear canals and TM's normal and nose and mouth without ulcers or lesions     NECK: no adenopathy, no asymmetry, masses, or scars and thyroid normal to palpation     RESP: lungs clear to auscultation - no rales, " rhonchi or wheezes     CV: regular rates and rhythm, normal S1 S2, no S3 or S4 and no murmur, click or rub     ABDOMEN:  soft, nontender, no HSM or masses and bowel sounds normal     MS: extremities normal- no gross deformities noted, no evidence of inflammation in joints, FROM in all extremities.     SKIN: no suspicious lesions or rashes     NEURO: Normal strength and tone, sensory exam grossly normal, mentation intact and speech normal     PSYCH: mentation appears normal. and affect normal/bright     LYMPHATICS: No cervical adenopathy    Recent Labs   Lab Test 01/21/22  1844      POTASSIUM 4.2   CR 0.96        Diagnostics:  Labs pending at this time.  Results will be reviewed when available.   No EKG required for low risk surgery (cataract, skin procedure, breast biopsy, etc).    Revised Cardiac Risk Index (RCRI):  The patient has the following serious cardiovascular risks for perioperative complications:   - No serious cardiac risks = 0 points              Signed Electronically by: Jordan Johnson MD  Copy of this evaluation report is provided to requesting physician.

## 2022-03-24 NOTE — H&P (VIEW-ONLY)
[address]  [place] [address]  Phone: [phone]  Fax: [phone]  Primary Provider: Bob Howard  Pre-op Performing Provider: BOB HOWARD      PREOPERATIVE EVALUATION:  Today's date: 3/24/2022    Jono Bearden is a 51 year old male who presents for a preoperative evaluation.    Surgical Information:  Surgery/Procedure: OPEN REDUCTION INTERNAL FIXATION, fifth metatarsal right foot   Surgery Location: [facility]  Surgeon: Dr. Ferrer  Surgery Date: 038/28/2022  Time of Surgery: 12:30pm   Where patient plans to recover: At home with family  Fax number for surgical facility: Note does not need to be faxed, will be available electronically in Epic.    Type of Anesthesia Anticipated: Combined MAC with Popliteal Block     Assessment & Plan     The proposed surgical procedure is considered LOW risk.    Preop general physical exam  Patient can proceed for surgery with no other further testing required  - Basic metabolic panel  (Ca, Cl, CO2, Creat, Gluc, K, Na, BUN); Future  - CBC with platelets; Future  - Basic metabolic panel  (Ca, Cl, CO2, Creat, Gluc, K, Na, BUN)  - CBC with platelets    Closed fracture of phalanx of right fifth toe with delayed healing, subsequent encounter  Patient has no discomfort while resting.  He is not using any pain medication at this time           Risks and Recommendations:  The patient has the following additional risks and recommendations for perioperative complications:   - No identified additional risk factors other than previously addressed        RECOMMENDATION:  APPROVAL GIVEN to proceed with proposed procedure, without further diagnostic evaluation.        56}    Subjective     HPI related to upcoming procedure:   Jono returns today he has his scheduled surgery with Dr. Ferrer next week for the right fifth toe fracture.  His last x-ray showed delayed healing.  He notes no pain he has been working from home he is  already stopped using any anti-inflammatories.  He has not had any recent fever, cough, sore throat or abdominal pain.  He is already had his COVID-19 testing today    Preop Questions 3/23/2022   1. Have you ever had a heart attack or stroke? No   2. Have you ever had surgery on your heart or blood vessels, such as a stent placement, a coronary artery bypass, or surgery on an artery in your head, neck, heart, or legs? No   3. Do you have chest pain with activity? No   4. Do you have a history of  heart failure? No   5. Do you currently have a cold, bronchitis or symptoms of other infection? No   6. Do you have a cough, shortness of breath, or wheezing? No   7. Do you or anyone in your family have previous history of blood clots? No   8. Do you or does anyone in your family have a serious bleeding problem such as prolonged bleeding following surgeries or cuts? No   9. Have you ever had problems with anemia or been told to take iron pills? No   10. Have you had any abnormal blood loss such as black, tarry or bloody stools? No   11. Have you ever had a blood transfusion? UNKNOWN -    12. Are you willing to have a blood transfusion if it is medically needed before, during, or after your surgery? Yes   13. Have you or any of your relatives ever had problems with anesthesia? No   14. Do you have sleep apnea, excessive snoring or daytime drowsiness? No   15. Do you have any artifical heart valves or other implanted medical devices like a pacemaker, defibrillator, or continuous glucose monitor? No   16. Do you have artificial joints? No   17. Are you allergic to latex? No     56}        Review of Systems  CONSTITUTIONAL: NEGATIVE for fever, chills, change in weight  INTEGUMENTARY/SKIN: NEGATIVE for worrisome rashes, moles or lesions  EYES: NEGATIVE for vision changes or irritation  ENT/MOUTH: NEGATIVE for ear, mouth and throat problems  RESP: NEGATIVE for significant cough or SOB  CV: NEGATIVE for chest pain, palpitations  "or peripheral edema  GI: NEGATIVE for nausea, abdominal pain, heartburn, or change in bowel habits  : NEGATIVE for frequency, dysuria, or hematuria  MUSCULOSKELETAL: NEGATIVE for significant arthralgias or myalgia  NEURO: NEGATIVE for weakness, dizziness or paresthesias  ENDOCRINE: NEGATIVE for temperature intolerance, skin/hair changes  HEME: NEGATIVE for bleeding problems  PSYCHIATRIC: NEGATIVE for changes in mood or affect    Patient Active Problem List    Diagnosis Date Noted     Closed displaced fracture of fifth metatarsal bone of right foot, initial encounter 03/04/2022     Priority: Medium     Essential hypertension 03/23/2017     Priority: Medium     Acne 03/23/2017     Priority: Medium     History of kidney stones 03/23/2017     Priority: Medium     Overweight 03/23/2017     Priority: Medium      Past Medical History:   Diagnosis Date     Hypertension      Past Surgical History:   Procedure Laterality Date     LEG SURGERY      For a Fracture     NO PAST SURGERIES       Current Outpatient Medications   Medication Sig Dispense Refill     lisinopril (ZESTRIL) 10 MG tablet Take 1 tablet (10 mg) by mouth daily 90 tablet 3     tretinoin (RETIN-A) 0.1 % external cream Apply topically At Bedtime 45 g 6       Allergies   Allergen Reactions     Ampicillin Unknown     As child-unknown reaction        Social History     Tobacco Use     Smoking status: Never Smoker     Smokeless tobacco: Never Used   Substance Use Topics     Alcohol use: Yes     Comment: Alcoholic Drinks/day: moderate       History   Drug Use No         Objective     Ht 1.702 m (5' 7.01\")   Wt 78.1 kg (172 lb 2 oz)   BMI 26.95 kg/m      Physical Exam    GENERAL APPEARANCE: healthy, alert and no distress     EYES: EOMI,  PERRL     HENT: ear canals and TM's normal and nose and mouth without ulcers or lesions     NECK: no adenopathy, no asymmetry, masses, or scars and thyroid normal to palpation     RESP: lungs clear to auscultation - no rales, " rhonchi or wheezes     CV: regular rates and rhythm, normal S1 S2, no S3 or S4 and no murmur, click or rub     ABDOMEN:  soft, nontender, no HSM or masses and bowel sounds normal     MS: extremities normal- no gross deformities noted, no evidence of inflammation in joints, FROM in all extremities.     SKIN: no suspicious lesions or rashes     NEURO: Normal strength and tone, sensory exam grossly normal, mentation intact and speech normal     PSYCH: mentation appears normal. and affect normal/bright     LYMPHATICS: No cervical adenopathy    Recent Labs   Lab Test 01/21/22  1844      POTASSIUM 4.2   CR 0.96        Diagnostics:  Labs pending at this time.  Results will be reviewed when available.   No EKG required for low risk surgery (cataract, skin procedure, breast biopsy, etc).    Revised Cardiac Risk Index (RCRI):  The patient has the following serious cardiovascular risks for perioperative complications:   - No serious cardiac risks = 0 points              Signed Electronically by: Jordan Johnson MD  Copy of this evaluation report is provided to requesting physician.

## 2022-03-25 ENCOUNTER — ANESTHESIA EVENT (OUTPATIENT)
Dept: SURGERY | Facility: AMBULATORY SURGERY CENTER | Age: 52
End: 2022-03-25
Payer: COMMERCIAL

## 2022-03-25 LAB — SARS-COV-2 RNA RESP QL NAA+PROBE: NEGATIVE

## 2022-03-28 ENCOUNTER — ANESTHESIA (OUTPATIENT)
Dept: SURGERY | Facility: AMBULATORY SURGERY CENTER | Age: 52
End: 2022-03-28
Payer: COMMERCIAL

## 2022-03-28 ENCOUNTER — HOSPITAL ENCOUNTER (OUTPATIENT)
Facility: AMBULATORY SURGERY CENTER | Age: 52
Discharge: HOME | End: 2022-03-28
Attending: PODIATRIST
Payer: COMMERCIAL

## 2022-03-28 VITALS
WEIGHT: 174 LBS | HEART RATE: 69 BPM | TEMPERATURE: 96.7 F | OXYGEN SATURATION: 100 % | SYSTOLIC BLOOD PRESSURE: 125 MMHG | BODY MASS INDEX: 26.37 KG/M2 | HEIGHT: 68 IN | RESPIRATION RATE: 16 BRPM | DIASTOLIC BLOOD PRESSURE: 75 MMHG

## 2022-03-28 DIAGNOSIS — S92.351A CLOSED DISPLACED FRACTURE OF FIFTH METATARSAL BONE OF RIGHT FOOT, INITIAL ENCOUNTER: Primary | ICD-10-CM

## 2022-03-28 DIAGNOSIS — S92.351G CLOSED DISPLACED FRACTURE OF FIFTH METATARSAL BONE OF RIGHT FOOT WITH DELAYED HEALING, SUBSEQUENT ENCOUNTER: ICD-10-CM

## 2022-03-28 PROCEDURE — 28485 OPTX METATARSAL FX EACH: CPT | Mod: RT | Performed by: PODIATRIST

## 2022-03-28 DEVICE — IMPLANTABLE DEVICE: Type: IMPLANTABLE DEVICE | Site: FOOT | Status: FUNCTIONAL

## 2022-03-28 RX ORDER — PROPOFOL 10 MG/ML
INJECTION, EMULSION INTRAVENOUS CONTINUOUS PRN
Status: DISCONTINUED | OUTPATIENT
Start: 2022-03-28 | End: 2022-03-28

## 2022-03-28 RX ORDER — CLINDAMYCIN PHOSPHATE 900 MG/50ML
900 INJECTION, SOLUTION INTRAVENOUS SEE ADMIN INSTRUCTIONS
Status: DISCONTINUED | OUTPATIENT
Start: 2022-03-28 | End: 2022-03-29 | Stop reason: HOSPADM

## 2022-03-28 RX ORDER — ONDANSETRON 2 MG/ML
4 INJECTION INTRAMUSCULAR; INTRAVENOUS EVERY 30 MIN PRN
Status: DISCONTINUED | OUTPATIENT
Start: 2022-03-28 | End: 2022-03-29 | Stop reason: HOSPADM

## 2022-03-28 RX ORDER — LIDOCAINE HYDROCHLORIDE 20 MG/ML
INJECTION, SOLUTION INFILTRATION; PERINEURAL PRN
Status: DISCONTINUED | OUTPATIENT
Start: 2022-03-28 | End: 2022-03-28

## 2022-03-28 RX ORDER — PROPOFOL 10 MG/ML
INJECTION, EMULSION INTRAVENOUS PRN
Status: DISCONTINUED | OUTPATIENT
Start: 2022-03-28 | End: 2022-03-28

## 2022-03-28 RX ORDER — ONDANSETRON 4 MG/1
4 TABLET, ORALLY DISINTEGRATING ORAL EVERY 30 MIN PRN
Status: DISCONTINUED | OUTPATIENT
Start: 2022-03-28 | End: 2022-03-29 | Stop reason: HOSPADM

## 2022-03-28 RX ORDER — OXYCODONE HYDROCHLORIDE 5 MG/1
5-10 TABLET ORAL EVERY 6 HOURS PRN
Qty: 30 TABLET | Refills: 0 | Status: SHIPPED | OUTPATIENT
Start: 2022-03-28 | End: 2022-04-04

## 2022-03-28 RX ORDER — SODIUM CHLORIDE, SODIUM LACTATE, POTASSIUM CHLORIDE, CALCIUM CHLORIDE 600; 310; 30; 20 MG/100ML; MG/100ML; MG/100ML; MG/100ML
INJECTION, SOLUTION INTRAVENOUS CONTINUOUS
Status: DISCONTINUED | OUTPATIENT
Start: 2022-03-28 | End: 2022-03-29 | Stop reason: HOSPADM

## 2022-03-28 RX ORDER — DEXAMETHASONE SODIUM PHOSPHATE 4 MG/ML
INJECTION, SOLUTION INTRA-ARTICULAR; INTRALESIONAL; INTRAMUSCULAR; INTRAVENOUS; SOFT TISSUE PRN
Status: DISCONTINUED | OUTPATIENT
Start: 2022-03-28 | End: 2022-03-28

## 2022-03-28 RX ORDER — FENTANYL CITRATE 0.05 MG/ML
25 INJECTION, SOLUTION INTRAMUSCULAR; INTRAVENOUS
Status: DISCONTINUED | OUTPATIENT
Start: 2022-03-28 | End: 2022-03-29 | Stop reason: HOSPADM

## 2022-03-28 RX ORDER — FENTANYL CITRATE 0.05 MG/ML
25 INJECTION, SOLUTION INTRAMUSCULAR; INTRAVENOUS EVERY 5 MIN PRN
Status: DISCONTINUED | OUTPATIENT
Start: 2022-03-28 | End: 2022-03-29 | Stop reason: HOSPADM

## 2022-03-28 RX ORDER — HYDROMORPHONE HCL IN WATER/PF 6 MG/30 ML
0.2 PATIENT CONTROLLED ANALGESIA SYRINGE INTRAVENOUS EVERY 5 MIN PRN
Status: DISCONTINUED | OUTPATIENT
Start: 2022-03-28 | End: 2022-03-29 | Stop reason: HOSPADM

## 2022-03-28 RX ORDER — ACETAMINOPHEN 325 MG/1
975 TABLET ORAL ONCE
Status: COMPLETED | OUTPATIENT
Start: 2022-03-28 | End: 2022-03-28

## 2022-03-28 RX ORDER — LIDOCAINE 40 MG/G
CREAM TOPICAL
Status: DISCONTINUED | OUTPATIENT
Start: 2022-03-28 | End: 2022-03-29 | Stop reason: HOSPADM

## 2022-03-28 RX ORDER — BUPIVACAINE HYDROCHLORIDE 5 MG/ML
INJECTION, SOLUTION EPIDURAL; INTRACAUDAL
Status: COMPLETED | OUTPATIENT
Start: 2022-03-28 | End: 2022-03-28

## 2022-03-28 RX ORDER — CLINDAMYCIN PHOSPHATE 900 MG/50ML
900 INJECTION, SOLUTION INTRAVENOUS
Status: COMPLETED | OUTPATIENT
Start: 2022-03-28 | End: 2022-03-28

## 2022-03-28 RX ORDER — FENTANYL CITRATE 0.05 MG/ML
50 INJECTION, SOLUTION INTRAMUSCULAR; INTRAVENOUS
Status: DISCONTINUED | OUTPATIENT
Start: 2022-03-28 | End: 2022-03-29 | Stop reason: HOSPADM

## 2022-03-28 RX ORDER — PHENYLEPHRINE HYDROCHLORIDE 10 MG/ML
INJECTION INTRAVENOUS PRN
Status: DISCONTINUED | OUTPATIENT
Start: 2022-03-28 | End: 2022-03-28

## 2022-03-28 RX ORDER — MEPERIDINE HYDROCHLORIDE 25 MG/ML
12.5 INJECTION INTRAMUSCULAR; INTRAVENOUS; SUBCUTANEOUS
Status: DISCONTINUED | OUTPATIENT
Start: 2022-03-28 | End: 2022-03-29 | Stop reason: HOSPADM

## 2022-03-28 RX ORDER — OXYCODONE HYDROCHLORIDE 5 MG/1
5 TABLET ORAL EVERY 4 HOURS PRN
Status: DISCONTINUED | OUTPATIENT
Start: 2022-03-28 | End: 2022-03-29 | Stop reason: HOSPADM

## 2022-03-28 RX ADMIN — PHENYLEPHRINE HYDROCHLORIDE 100 MCG: 10 INJECTION INTRAVENOUS at 12:58

## 2022-03-28 RX ADMIN — PROPOFOL 200 MCG/KG/MIN: 10 INJECTION, EMULSION INTRAVENOUS at 12:18

## 2022-03-28 RX ADMIN — CLINDAMYCIN PHOSPHATE 900 MG: 900 INJECTION, SOLUTION INTRAVENOUS at 12:13

## 2022-03-28 RX ADMIN — CLINDAMYCIN PHOSPHATE 900 MG: 900 INJECTION, SOLUTION INTRAVENOUS at 12:05

## 2022-03-28 RX ADMIN — DEXAMETHASONE SODIUM PHOSPHATE 4 MG: 4 INJECTION, SOLUTION INTRA-ARTICULAR; INTRALESIONAL; INTRAMUSCULAR; INTRAVENOUS; SOFT TISSUE at 12:30

## 2022-03-28 RX ADMIN — PROPOFOL 30 MG: 10 INJECTION, EMULSION INTRAVENOUS at 12:18

## 2022-03-28 RX ADMIN — ACETAMINOPHEN 975 MG: 325 TABLET ORAL at 12:08

## 2022-03-28 RX ADMIN — FENTANYL CITRATE 100 MCG: 0.05 INJECTION, SOLUTION INTRAMUSCULAR; INTRAVENOUS at 11:50

## 2022-03-28 RX ADMIN — BUPIVACAINE HYDROCHLORIDE 10 ML: 5 INJECTION, SOLUTION EPIDURAL; INTRACAUDAL at 11:53

## 2022-03-28 RX ADMIN — LIDOCAINE HYDROCHLORIDE 60 MG: 20 INJECTION, SOLUTION INFILTRATION; PERINEURAL at 12:18

## 2022-03-28 RX ADMIN — PROPOFOL 30 MG: 10 INJECTION, EMULSION INTRAVENOUS at 12:23

## 2022-03-28 RX ADMIN — SODIUM CHLORIDE, SODIUM LACTATE, POTASSIUM CHLORIDE, CALCIUM CHLORIDE: 600; 310; 30; 20 INJECTION, SOLUTION INTRAVENOUS at 11:48

## 2022-03-28 RX ADMIN — BUPIVACAINE HYDROCHLORIDE 20 ML: 5 INJECTION, SOLUTION EPIDURAL; INTRACAUDAL at 11:57

## 2022-03-28 NOTE — ANESTHESIA PROCEDURE NOTES
Sciatic Procedure Note    Pre-Procedure   Staff -        Anesthesiologist:  Tavon Harrison MD       Performed By: anesthesiologist       Location: pre-op       Procedure Start/Stop Times: 3/28/2022 11:55 AM and 3/28/2022 11:58 AM       Pre-Anesthestic Checklist: patient identified, IV checked, site marked, risks and benefits discussed, informed consent, monitors and equipment checked, pre-op evaluation, at physician/surgeon's request and post-op pain management  Timeout:       Correct Patient: Yes        Correct Procedure: Yes        Correct Site: Yes        Correct Position: Yes        Correct Laterality: Yes        Site Marked: Yes  Procedure Documentation  Procedure: Sciatic       Diagnosis: POSTOP PAIN CONTROL PER SURGEON REQUEST       Laterality: right       Patient Position: supine       Patient Prep/Sterile Barriers: sterile gloves, mask       Skin prep: Chloraprep (popliteal approach).       Needle Type: insulated and short bevel       Needle Gauge: 21.        Needle Length (Inches): 4        Ultrasound guided       1. Ultrasound was used to identify targeted nerve, plexus, vascular marker, or fascial plane and place a needle adjacent to it in real-time.       2. Ultrasound was used to visualize the spread of anesthetic in close proximity to the above referenced structure.       3. A permanent image is entered into the patient's record.       4. The visualized anatomic structures appeared normal.       5. There were no apparent abnormal pathologic findings.    Assessment/Narrative         The placement was negative for: blood aspirated, painful injection and site bleeding       Paresthesias: No.     Bolus given via needle..        Secured via.        Insertion/Infusion Method: Single Shot       Complications: none       Injection made incrementally with aspirations every 2 mL.    Medication(s) Administered   Bupivacaine 0.5% PF (Infiltration), 20 mL  Medication Administration Time: 3/28/2022 11:57  AM

## 2022-03-28 NOTE — ANESTHESIA PREPROCEDURE EVALUATION
Anesthesia Pre-Procedure Evaluation    Patient: Jono Bearden   MRN: [id number] : 1970        Procedure : Procedure(s):  OPEN REDUCTION INTERNAL FIXATION, fifth metatarsal right foot          Past Medical History:   Diagnosis Date     Hypertension       Past Surgical History:   Procedure Laterality Date     LEG SURGERY      For a Fracture     NO PAST SURGERIES        Allergies   Allergen Reactions     Ampicillin Unknown     As child-unknown reaction      Social History     Tobacco Use     Smoking status: Never Smoker     Smokeless tobacco: Never Used   Substance Use Topics     Alcohol use: Yes     Comment: Alcoholic Drinks/day: moderate      Wt Readings from Last 1 Encounters:   22 78.9 kg (174 lb)        Anesthesia Evaluation   Pt has not had prior anesthetic         ROS/MED HX  ENT/Pulmonary:  - neg pulmonary ROS     Neurologic:  - neg neurologic ROS     Cardiovascular:  - neg cardiovascular ROS   (+) hypertension-----    METS/Exercise Tolerance:     Hematologic:  - neg hematologic  ROS     Musculoskeletal:  - neg musculoskeletal ROS     GI/Hepatic:  - neg GI/hepatic ROS     Renal/Genitourinary:  - neg Renal ROS     Endo:  - neg endo ROS     Psychiatric/Substance Use:  - neg psychiatric ROS     Infectious Disease:  - neg infectious disease ROS     Malignancy:  - neg malignancy ROS     Other:  - neg other ROS          Physical Exam    Airway  airway exam normal      Mallampati: II   TM distance: > 3 FB   Neck ROM: full   Mouth opening: > 3 cm    Respiratory Devices and Support         Dental  no notable dental history         Cardiovascular   cardiovascular exam normal          Pulmonary   pulmonary exam normal                OUTSIDE LABS:  CBC:   Lab Results   Component Value Date    WBC 5.0 2022    HGB 17.2 2022    HCT 49.7 2022     2022     BMP:   Lab Results   Component Value Date     2022     2022    POTASSIUM 4.6 2022    POTASSIUM 4.2  01/21/2022    CHLORIDE 102 03/24/2022    CHLORIDE 103 01/21/2022    CO2 24 03/24/2022    CO2 28 01/21/2022    BUN 16 03/24/2022    BUN 15 01/21/2022    CR 1.00 03/24/2022    CR 0.96 01/21/2022     03/24/2022     (H) 01/21/2022     COAGS: No results found for: PTT, INR, FIBR  POC: No results found for: BGM, HCG, HCGS  HEPATIC: No results found for: ALBUMIN, PROTTOTAL, ALT, AST, GGT, ALKPHOS, BILITOTAL, BILIDIRECT, MIKEY  OTHER:   Lab Results   Component Value Date    BLOSSOM 10.3 03/24/2022       Anesthesia Plan    ASA Status:  2   NPO Status:  NPO Appropriate    Anesthesia Type: General.     - Airway: Native airway   Induction: Propofol.   Maintenance: TIVA.        Consents    Anesthesia Plan(s) and associated risks, benefits, and realistic alternatives discussed. Questions answered and patient/representative(s) expressed understanding.     - Discussed: Risks, Benefits and Alternatives for BOTH SEDATION and the PROCEDURE were discussed     - Discussed with:  Patient         Postoperative Care    Pain management: Multi-modal analgesia, Peripheral nerve block (Single Shot).   PONV prophylaxis: Ondansetron (or other 5HT-3), Dexamethasone or Solumedrol     Comments:    Other Comments: Reviewed anesthetic options and risks, including risk of dental trauma. Patient agrees to proceed.     GA with mask/TIVA. Pop/saph blocks for postop pain control per surgeon request.     Recent Results (from the past 120 hour(s))  -Asymptomatic COVID-19 Virus (Coronavirus) by PCR Nose:   Collection Time: 03/24/22  2:02 PM  Specimen: Nose; Swab       Result                                            Value                         Ref Range                       SARS CoV2 PCR                                     Negative                      Negative, Testing sent t*  -Basic metabolic panel  (Ca, Cl, CO2, Creat, Gluc, K, Na, BUN):   Collection Time: 03/24/22  3:15 PM       Result                                            Value                          Ref Range                       Sodium                                            140                           136 - 145 mmol/L                Potassium                                         4.6                           3.5 - 5.0 mmol/L                Chloride                                          102                           98 - 107 mmol/L                 Carbon Dioxide (CO2)                              24                            22 - 31 mmol/L                  Anion Gap                                         14                            5 - 18 mmol/L                   Urea Nitrogen                                     16                            8 - 22 mg/dL                    Creatinine                                        1.00                          0.70 - 1.30 mg/dL               Calcium                                           10.3                          8.5 - 10.5 mg/dL                Glucose                                           104                           70 - 125 mg/dL                  GFR Estimate                                      90                            >60 mL/min/1.73m2          -CBC with platelets:   Collection Time: 03/24/22  3:15 PM       Result                                            Value                         Ref Range                       WBC Count                                         5.0                           4.0 - 11.0 10e3/uL              RBC Count                                         5.23                          4.40 - 5.90 10e6/uL             Hemoglobin                                        17.2                          13.3 - 17.7 g/dL                Hematocrit                                        49.7                          40.0 - 53.0 %                   MCV                                               95                            78 - 100 fL                     MCH                                                32.9                          26.5 - 33.0 pg                  MCHC                                              34.6                          31.5 - 36.5 g/dL                RDW                                               12.2                          10.0 - 15.0 %                   Platelet Count                                    259                           150 - 450 10e3/uL                    Tavon Harrison MD

## 2022-03-28 NOTE — INTERVAL H&P NOTE
"I have reviewed the surgical (or preoperative) H&P that is linked to this encounter, and examined the patient. There are no significant changes    Clinical Conditions Present on Arrival:  Clinically Significant Risk Factors Present on Admission            # Hypercalcemia: Ca = N/A and/or iCa = N/A on admission, will monitor as appropriate         # Overweight: Estimated body mass index is 26.46 kg/m  as calculated from the following:    Height as of this encounter: 1.727 m (5' 8\").    Weight as of this encounter: 78.9 kg (174 lb).       "

## 2022-03-28 NOTE — ANESTHESIA PROCEDURE NOTES
Adductor canal Procedure Note    Pre-Procedure   Staff -        Anesthesiologist:  Tavon Harrison MD       Performed By: anesthesiologist       Location: pre-op       Procedure Start/Stop Times: 3/28/2022 11:50 AM and 3/28/2022 11:54 AM       Pre-Anesthestic Checklist: patient identified, IV checked, site marked, risks and benefits discussed, informed consent, monitors and equipment checked, pre-op evaluation, at physician/surgeon's request and post-op pain management  Timeout:       Correct Patient: Yes        Correct Procedure: Yes        Correct Site: Yes        Correct Position: Yes        Correct Laterality: Yes        Site Marked: Yes  Procedure Documentation  Procedure: Adductor canal       Diagnosis: POSTOP PAIN CONTROL PER SURGEON REQUEST       Laterality: right       Patient Position: supine       Patient Prep/Sterile Barriers: sterile gloves, mask       Skin prep: Chloraprep       Needle Type: short bevel and insulated       Needle Gauge: 21.        Needle Length (Inches): 4        Ultrasound guided       1. Ultrasound was used to identify targeted nerve, plexus, vascular marker, or fascial plane and place a needle adjacent to it in real-time.       2. Ultrasound was used to visualize the spread of anesthetic in close proximity to the above referenced structure.       3. A permanent image is entered into the patient's record.       4. The visualized anatomic structures appeared normal.       5. There were no apparent abnormal pathologic findings.    Assessment/Narrative         The placement was negative for: blood aspirated, painful injection and site bleeding       Paresthesias: No.     Bolus given via needle..        Secured via.        Insertion/Infusion Method: Single Shot       Complications: none       Injection made incrementally with aspirations every 2 mL.    Medication(s) Administered   Bupivacaine 0.5% PF (Infiltration), 10 mL  Medication Administration Time: 3/28/2022 11:53 AM

## 2022-03-28 NOTE — OP NOTE
Date: 3/28/22    Surgeon: OLEKSANDR Ferrer DPM    Preoperative diagnosis: Displaced fracture 5th metatarsal right foot     Postoperative diagnosis: Same    Procedure: ORIF 5th metatarsal right foot with c-arm     Anesthesia: Popliteal block with IV-sedation    Hemostasis: Pneumatic ankle tourniquet 250 mmHg    Pathology: none    Injectables: none    Materials: Jacob plate/screws, 4-0 vicryl, 4-0 nylon    Complications: None    Blood loss: 2 cc      Findings: Patient presents for operative intervention for a displaced fracture along the base of the 5th metatarsal right foot. We discussed today's procedure to include reduction of the fracture fragment, with plate/screw fixation, non-weight bearing post-op. Risks include but not limited to infection, non-union and need for additional surgery. He consents to surgery. Patient questions invited and answered, including appropriate risk, benefits and complications. No guarantees given or implied. Patient has been NPO.    Description: Patient was brought to the operating room and placed on the table in supine position. IV-sedation and popliteal block was administered by the anesthesia department. The foot was then prepped and draped in usual aseptic manner. The extremity was elevated and exsanguinated. Well-padded ankle pneumatic tourniquet was inflated to 250mmHg and the following procedure was then performed: Attention was directed to the dorsal lateral aspect of the 5th metatarsal where a dorsal incision was made. The incision was carried into the subcutaneous tissue with care taken to avoid any neurovascular structures and cauterize superficial bleeders. Again the incision was carried to the periosteum which was longitudinally incised with a #15 blade and reflected. At this time, a displaced fragment was noted along the base of the 5th metatarsal. The non-viable bone and hematoma was removed with a rongeur. Next, the surgical site was irrigated and a bone clamp was used to  reduce the fragment. C-arm was used to visualize the reduction on both AP and lateral views. Next, a Kensett claw plate was placed and tamped into the base of the 5th metatarsal. The plate was secured with olive wires and again alignment was checked via c-arm. Next using standard AO technique, a screw was placed into the compression hole and the plate was advanced distally. An inter-frag screw was placed along the proximal plate and visualized via c-arm. With good reduction and compression of the fracture fragment, three additional locking screws were placed and checked via c-arm after all temporary fixation was removed and noted to have good anatomic alignment and reduction of the fracture. The surgical site was irrigated with normal saline. The periosteum and subcutaneous tissue was reapproximated with 4-0 vicryl in a running suture fashion and the skin was closed with 4-0 nylon in a running interlocking suture fashion.     Dressings consisted of adaptic, 4x4's, nitin/kerlix roll and an ace wrap. The pneumatic tourniquet was released and a hyperemic response was noted to the digits on the right foot.     The patient appeared to tolerate all the procedures and anesthesia well without apparent complications. Patient was transported from the operating room to the recovery room with vital signs stable and neurovascular status as it was pre-operatively to the right foot. Patient to be discharged home per anesthesia. Written and verbal homecare instructions given to remain non-weight bearing on the right foot at all times, CAM boot for stability. Patient to follow up in office for post-operative management in one week.

## 2022-03-28 NOTE — ANESTHESIA CARE TRANSFER NOTE
Patient: Jono Bearden    Procedure: Procedure(s):  OPEN REDUCTION INTERNAL FIXATION, fifth metatarsal right foot       Diagnosis: Closed displaced fracture of fifth metatarsal bone of right foot with delayed healing, subsequent encounter [S92.643G]  Diagnosis Additional Information: No value filed.    Anesthesia Type:   General     Note:    Oropharynx: oropharynx clear of all foreign objects and spontaneously breathing  Level of Consciousness: drowsy  Oxygen Supplementation: face mask  Level of Supplemental Oxygen (L/min / FiO2): 6  Independent Airway: airway patency satisfactory and stable  Dentition: dentition unchanged  Vital Signs Stable: post-procedure vital signs reviewed and stable  Report to RN Given: handoff report given  Patient transferred to: Phase II    Handoff Report: Identifed the Patient, Identified the Reponsible Provider, Reviewed the pertinent medical history, Discussed the surgical course, Reviewed Intra-OP anesthesia mangement and issues during anesthesia, Set expectations for post-procedure period and Allowed opportunity for questions and acknowledgement of understanding      Vitals:  Vitals Value Taken Time   /63 03/28/22 1315   Temp 96.7  F (35.9  C) 03/28/22 1315   Pulse 66 03/28/22 1315   Resp 14 03/28/22 1315   SpO2 100 % 03/28/22 1315       Electronically Signed By: SUZETTE MANE CRNA  March 28, 2022  1:17 PM

## 2022-03-28 NOTE — ANESTHESIA POSTPROCEDURE EVALUATION
Patient: Jono Bearden    Procedure: Procedure(s):  OPEN REDUCTION INTERNAL FIXATION, fifth metatarsal right foot       Anesthesia Type:  General    Note:  Disposition: Outpatient   Postop Pain Control: Uneventful            Sign Out: Well controlled pain   PONV: No   Neuro/Psych: Uneventful            Sign Out: Acceptable/Baseline neuro status   Airway/Respiratory: Uneventful            Sign Out: Acceptable/Baseline resp. status   CV/Hemodynamics: Uneventful            Sign Out: Acceptable CV status; No obvious hypovolemia; No obvious fluid overload   Other NRE: NONE   DID A NON-ROUTINE EVENT OCCUR? No           Last vitals:  Vitals Value Taken Time   /67 03/28/22 1330   Temp 96.7  F (35.9  C) 03/28/22 1315   Pulse 67 03/28/22 1339   Resp 16 03/28/22 1330   SpO2 100 % 03/28/22 1339   Vitals shown include unvalidated device data.    Electronically Signed By: Tavon Harrison MD  March 28, 2022  1:43 PM

## 2022-03-28 NOTE — DISCHARGE INSTRUCTIONS
Take tylenol and ibuprofen every 6 hours as your main form of pain control.     Acetaminophen (Tylenol): Next Dose: 600 pm. Take 650-1000 mg every 6 hours for mild to moderate pain.    Ibuprofen (Motrin, Advil): Next Dose: When you get home. Take 600 mg every 6 hours for mild to moderate pain.    Do not exceed 4,000 mg of acetaminophen during a 24 hour period  or 1000 mg per dose. Keep in mind that acetaminophen can also be found in many over-the-counter cold medications as well as narcotics that may be given for pain.     Narcotics:    Oxycodone: Next Dose: When you start to feel sign of block wearing off or have pain. Take 1-2 tab(s) every 6 hours for moderate to severe pain not controlled with tylenol and ibuprofen.    Senna-Docusate (Stool Softener): Next dose: When you get home. Take as instructed on the bottle. This is an over-the-counter medication. Take this while taking narcotic medications to prevent constipation.      Discharge Instructions: After Your Surgery    You ve just had surgery. During surgery, you were given medicine called anesthesia to keep you relaxed and free of pain. After surgery, you may have some pain or nausea. This is common. Here are some tips for feeling better and getting well after surgery.    Going home    Your healthcare provider will show you how to take care of yourself when you go home. He or she will also answer your questions. Have an adult family member or friend drive you home. For the first 24 hours after your surgery:    Don't drive or use heavy equipment.    Don't make important decisions or sign legal papers.    Don't drink alcohol.    Have an adult stay with you for 24 hours. He or she can watch for problems and help keep you safe.  Be sure to go to all follow-up visits with your healthcare provider. And rest after your surgery for as long as your healthcare provider tells you to.    Coping with pain    If you have pain after surgery, pain medicine will help you feel  better. Take it as told, before pain becomes severe. Also, ask your healthcare provider or pharmacist about other ways to control pain. This might be with heat, ice, or relaxation. And follow any other instructions your surgeon or nurse gives you.    Tips for taking pain medicine    To get the best relief possible, remember these points:    Pain medicines can upset your stomach. Taking them with a little food may help.    Most pain relievers taken by mouth need at least 20 to 30 minutes to start to work.    Don't wait till your pain becomes severe before you take your medicine. Try to time your medicine so that you can take it before starting an activity. This might be before you get dressed, go for a walk, or sit down for dinner.    Constipation is a common side effect of pain medicines. It's ok to take medicines such as laxatives or stool softeners to help ease constipation. Also ask if you should skip any foods. Drinking lots of fluids and eating foods such as fruits and vegetables that are high in fiber can also help.    Drinking alcohol and taking pain medicine can cause dizziness and slow your breathing. It can even be deadly. Don't drink alcohol while taking pain medicine.    Pain medicine can make you react more slowly to things. Don't drive or run machinery while taking pain medicine.  Your healthcare provider may tell you to take acetaminophen to help ease your pain. Ask him or her how much you are supposed to take each day. Acetaminophen or other pain relievers may interact with your prescription medicines or other over-the-counter (OTC) medicines. Some prescription medicines have acetaminophen and other ingredients. Using both prescription and OTC acetaminophen for pain can cause you to overdose. Read the labels on your OTC medicines with care. This will help you to clearly know the list of ingredients, how much to take, and any warnings. It may also help you not take too much acetaminophen. If you have  questions or don't understand the information, ask your pharmacist or healthcare provider to explain it to you before you take the OTC medicine.    Managing nausea    Some people have an upset stomach after surgery. This is often because of anesthesia, pain, or pain medicine, or the stress of surgery. These tips will help you handle nausea and eat healthy foods as you get better. If you were on a special food plan before surgery, ask your healthcare provider if you should follow it while you get better. These tips may help:      Don't push yourself to eat. Your body will tell you when to eat and how much.    Start off with clear liquids and soup. They are easier to digest.    Next try semi-solid foods, such as mashed potatoes, applesauce, and gelatin, as you feel ready.    Slowly move to solid foods. Don t eat fatty, rich, or spicy foods at first.    Don't force yourself to have 3 large meals a day. Instead eat smaller amounts more often.    Take pain medicines with a small amount of solid food, such as crackers or toast, to prevent nausea.    When to call your healthcare provider    Call your healthcare provider if:    You still have intolerable pain an hour after taking medicine. The medicine may not be strong enough.    You feel too sleepy, dizzy, or groggy. The medicine may be too strong.    You have side effects such as nausea or vomiting, or skin changes such as rash, itching, or hives. Your healthcare provider may suggest other medicines to control side effects.  Rash, itching, or hives may mean you have an allergic reaction. Report this right away. If you have trouble breathing or facial swelling, call 911 right away.    If you have obstructive sleep apnea    You were given anesthesia medicine during surgery to keep you comfortable and free of pain. After surgery, you may have more apnea spells because of this medicine and other medicines you were given. The spells may last longer than usual.   At  home:    Keep using the continuous positive airway pressure (CPAP) device when you sleep. Unless your healthcare provider tells you not to, use it when you sleep, day or night. CPAP is a common device used to treat obstructive sleep apnea.    Talk with your provider before taking any pain medicine, muscle relaxants, or sedatives. Your provider will tell you about the possible dangers of taking these medicines.      Arm and Shoulder Blocks:    Always make sure your entire arm is supported, including the wrist.  Do not let your wrist dangle over the end of the sling.  Pad and cushion the elbow and wrist.     In rare cases, you may temporarily develop a drooping eyelid, hoarseness or shortness of breath after the block procedure for shoulder surgery.  Your anesthesiologist will discuss these with you should they occur.       Leg, Knee and Foot blocks:    Do not attempt to walk or bear weight on your leg until all numbness has disappeared and full muscle strength has returned.  Protect yourself from falls!    Follow your surgeon's instructions about when you can begin to walk and put any weight on your leg or foot.     Do not stand or walk without assistance or your crutches if your foot or leg still feels numb, heavy or weak.      Abdomen (TAP) and Chest (Paravertebral) Blocks:    Provides pain relief from your incision but does not completely numb your abdomen or chest.    For paravertebral blocks in the chest area, call/notify your nurse immediately if you have any shortness of breath or difficulty breathing.      For All Types of Blocks:    You arm/leg will be weak, numb and difficult for your brain to locate.  It may feel heavy or absent.  This is normal.     Your hand/foot may feel warmer than usual after a nerve block.  This is also normal and will go away as the block wears off.    Depending on the medication administered, your block may last up to 24 hours.    Follow your surgeon's instructions for when you can  move your arm or leg, take pain medications, use cold packs and care of your surgical site.      Contact your Surgeon for:    Inadequate pain control after taking your oral pain medications.    Nausea or vomiting at home.    Bleeding or any other concerns about the incision.     Cold and/or discolored fingers or toes.    If there are concerns related to the block you received, contact your Surgeon. They will then contact the Anesthesia team.     If the hematoma (bruise) at the site of the nerve block seems to be getting bigger.    If the site where the block was performed is red, swollen, draining or hot to touch.    If you develop a painful sensation down your arm or leg.     Any concerns about your anesthetic or the nerve block.

## 2022-03-28 NOTE — PROGRESS NOTES
Patients camboot applied. Instruction provided. Pt verbalizes and demonstrates understanding.    Karen Rose RN on 3/28/2022 at 2:05 PM

## 2022-03-28 NOTE — PROGRESS NOTES
Timeout performed: Yes    Time timeout performed: *1150    Time procedure began: 1150    Block Assessment/Safety: (prior to administration of Versed and Fentanyl)    Nerve block pamphlet: given    Safety instructions discussed and reviewed: Yes    Vitals and RASS score pre-procedure and every 5 minutes throughout procedure until block completed: All vitals stable    Cardiac Rhythm: Normal sinus     Pain scale used: 0-10 Numeric Pain Rating Scale, with 10 being the worst pain imaginable    Pain prior to procedure: 1/10    Pain during procedure: 0/10    Pain post-procedure: 0/10    Time procedure ended: 1157    Patient observed 15 minutes post-procedure: Yes    Signs of local toxicity Signs/Symptoms: No  (CNS symptoms (may not occur): doug-oral numbness/tingling, tinnitus, metallic taste, seizures  CV collapse: rhythm disturbance, hypotension, bradycardia, altered consciousness)

## 2022-04-04 ENCOUNTER — OFFICE VISIT (OUTPATIENT)
Dept: PODIATRY | Facility: CLINIC | Age: 52
End: 2022-04-04
Attending: PODIATRIST
Payer: COMMERCIAL

## 2022-04-04 ENCOUNTER — ANCILLARY PROCEDURE (OUTPATIENT)
Dept: RADIOLOGY | Facility: CLINIC | Age: 52
End: 2022-04-04
Attending: PODIATRIST
Payer: COMMERCIAL

## 2022-04-04 VITALS — HEIGHT: 68 IN | WEIGHT: 172 LBS | BODY MASS INDEX: 26.07 KG/M2 | OXYGEN SATURATION: 99 % | HEART RATE: 81 BPM

## 2022-04-04 DIAGNOSIS — S92.351A CLOSED DISPLACED FRACTURE OF FIFTH METATARSAL BONE OF RIGHT FOOT, INITIAL ENCOUNTER: Primary | ICD-10-CM

## 2022-04-04 PROCEDURE — 99024 POSTOP FOLLOW-UP VISIT: CPT | Performed by: PODIATRIST

## 2022-04-04 ASSESSMENT — PAIN SCALES - GENERAL: PAINLEVEL: NO PAIN (1)

## 2022-04-04 NOTE — PATIENT INSTRUCTIONS
Remain non-weight bearing on your right leg.  Non-weight bearing means that NO weight can be placed on the affected leg. This is the most restrictive of all weight-bearing limitations. Since you are not able to bear any weight on the leg, an assistive device, such as a walker or crutches, will be necessary for you to walk.  When walking with your walker or crutches, keep your affected knee bent up and keep your toes off the floor. No weight means no weight; even the slightest bit of pressure through your leg can cause problems.    WHAT YOU NEED TO KNOW:    What is a walking boot?  A walking boot is a type of medical shoe used to protect the foot and ankle after an injury or surgery. The boot can be used for broken bones, tendon injuries, severe sprains, or shin splints. A walking boot helps keep the foot stable so it can heal. It can keep your weight off an area, such as your toe, as it heals. Most boots have between 2 and 5 adjustable straps and go mid-way up your calf.              How do I put on the walking boot?    You may want to wear a large sock.    Sit down and place your heel all the way to the back of the boot.    Wrap the soft liner around your foot and leg.    Place the front piece over the liner.    Start to fasten the straps closest to your toes then move up your leg.    Tighten the straps so they are snug but not too tight. The boot should limit movement but not cut off your blood flow.    If your boot has one or more air chambers, pump them up as directed by your healthcare provider.    Stand up and take a few steps to practice walking.    What else do I need to know?    Check your foot and toes often. Check your foot and toes for redness and swelling. If your toes are red, swollen, numb, or tingly, loosen your straps or deflate the air chamber. Over time, the swelling from the injury or surgery will decrease. When this happens, you may need to tighten the straps.    Be careful when you walk on wet  surfaces. The boot may be slippery.    Follow the instructions to wash the liner. Remove the liner and wash it by hand in cold water with a mild detergent. Do not use a washing machine or dryer. Place the liner flat to dry. Wash the plastic parts with a damp cloth and mild soap.    Ask about removing the boot to bathe or for motion exercises. You may need to leave the boot on when you bathe. Cover it with a plastic bag and tape the bag closed around your leg.    - You will need to get an x-ray done before your follow up. This can be at any of the [facility] or at [facility] located downstairs on the first floor in the [facility]. No appointment is needed but depending on availability you may have to wait.     [facility]  [address] [facility] [address], [place] [address]   Monday through Friday 7 am to 10 pm, Saturday and Sunday 8 am to 4:40 pm   P: [phone]      An X-ray uses a small amount of radiation to create images of your bones and internal organs. X-rays are most often used to detect bone or joint problems, or to check the heart and lungs (chest X-ray).   Let the technologist know   Tell the technologist if you:     Are or may be pregnant     Have had an X-ray of this part of your body before     Have metal in the part of your body being imaged      Before Your Test     You may be asked to remove your watch, jewelry, or garments with metal closures from the part of your body being imaged. These items can block part of the image.     You may be asked to put on a gown.     You may be asked about your overall health or any medications you take.  During Your Test     You will be asked to lie on a table, sit, or stand.     A lead apron may be draped over part of your body to shield it from the X-rays.     With an X-ray of your chest or abdomen, you will have to take a deep breath and hold it for a few seconds.     Each exam usually requires at least 2 X-rays.  You will need to move your body before each new X-ray.  After Your Test   Your doctor will discuss the test results with you during a follow-up appointment or over the phone.     [id number] [employer]. [address] [place] [address], [place] [address]. All rights reserved. This information is not intended as a substitute for professional medical care. Always follow your healthcare professional's instructions.

## 2022-04-04 NOTE — PROGRESS NOTES
"Podiatry Progress Note        ASSESSMENT: S/P ORIF 5th metatarsal right foot       TREATMENT:  -Surgical site on the right foot is progressing well. I reviewed the patient's x-rays which indicate reduction of the 5th metatarsal fracture with plate and screw fixation.     -Gauze dressing applied today which he will keep intact. Continue non-weight bearing on the right foot. CAM boot for stability.     -He will follow-up with me in 2 weeks for suture removal.     Donavon Ferrer DPM  [facility] Podiatry/Foot & Ankle Surgery      HPI: Jono Bearden was seen again today s/p ORIF 5th metatarsal right foot. Doing well today. He has remained non-weight bearing on the right foot.    Past Medical History:   Diagnosis Date     Hypertension        Allergies   Allergen Reactions     Ampicillin Unknown     As child-unknown reaction         Current Outpatient Medications:      lisinopril (ZESTRIL) 10 MG tablet, Take 1 tablet (10 mg) by mouth daily, Disp: 90 tablet, Rfl: 3     tretinoin (RETIN-A) 0.1 % external cream, Apply topically At Bedtime, Disp: 45 g, Rfl: 6    Review of Systems - Negative       OBJECTIVE:  Appearance: alert, well appearing, and in no distress.    Pulse 81   Ht 1.727 m (5' 8\")   Wt 78 kg (172 lb)   SpO2 99%   BMI 26.15 kg/m      @LDAVASC(10,16,17)@    No images are attached to the encounter.     Surgical site on the lateral right foot has intact sutures with skin edges well approximated, no gapping noted. No erythema right foot. Neurovascular status unchanged right foot.     "

## 2022-04-04 NOTE — LETTER
"    4/4/2022         RE: Jono Bearden  [address] [facility] [address]        Dear Colleague,    Thank you for referring your patient, Jono Bearden, to the [facility]. Please see a copy of my visit note below.    Podiatry Progress Note        ASSESSMENT: S/P ORIF 5th metatarsal right foot       TREATMENT:  -Surgical site on the right foot is progressing well. I reviewed the patient's x-rays which indicate reduction of the 5th metatarsal fracture with plate and screw fixation.     -Gauze dressing applied today which he will keep intact. Continue non-weight bearing on the right foot. CAM boot for stability.     -He will follow-up with me in 2 weeks for suture removal.     Donavon Ferrer DPM  [facility] Podiatry/Foot & Ankle Surgery      HPI: Jono Bearden was seen again today s/p ORIF 5th metatarsal right foot. Doing well today. He has remained non-weight bearing on the right foot.    Past Medical History:   Diagnosis Date     Hypertension        Allergies   Allergen Reactions     Ampicillin Unknown     As child-unknown reaction         Current Outpatient Medications:      lisinopril (ZESTRIL) 10 MG tablet, Take 1 tablet (10 mg) by mouth daily, Disp: 90 tablet, Rfl: 3     tretinoin (RETIN-A) 0.1 % external cream, Apply topically At Bedtime, Disp: 45 g, Rfl: 6    Review of Systems - Negative       OBJECTIVE:  Appearance: alert, well appearing, and in no distress.    Pulse 81   Ht 1.727 m (5' 8\")   Wt 78 kg (172 lb)   SpO2 99%   BMI 26.15 kg/m      @[facility](10,16,17)@    No images are attached to the encounter.     Surgical site on the lateral right foot has intact sutures with skin edges well approximated, no gapping noted. No erythema right foot. Neurovascular status unchanged right foot.         Again, thank you for allowing me to participate in the care of your patient.        Sincerely,        Donavon Ferrer DPM    "

## 2022-04-18 ENCOUNTER — OFFICE VISIT (OUTPATIENT)
Dept: PODIATRY | Facility: CLINIC | Age: 52
End: 2022-04-18
Attending: PODIATRIST
Payer: COMMERCIAL

## 2022-04-18 VITALS — BODY MASS INDEX: 25.76 KG/M2 | HEIGHT: 68 IN | WEIGHT: 170 LBS | HEART RATE: 82 BPM | OXYGEN SATURATION: 99 %

## 2022-04-18 DIAGNOSIS — S92.351A CLOSED DISPLACED FRACTURE OF FIFTH METATARSAL BONE OF RIGHT FOOT, INITIAL ENCOUNTER: Primary | ICD-10-CM

## 2022-04-18 PROCEDURE — 99024 POSTOP FOLLOW-UP VISIT: CPT | Performed by: PODIATRIST

## 2022-04-18 ASSESSMENT — PAIN SCALES - GENERAL: PAINLEVEL: NO PAIN (1)

## 2022-04-18 NOTE — PROGRESS NOTES
"Podiatry Progress Note        ASSESSMENT: S/P ORIF 5th metatarsal right foot       TREATMENT:  -Surgical site on the right foot is progressing well.     -I reviewed the patient's x-rays which indicate reduction of the 5th metatarsal fracture with plate and screw fixation and bony healing.     -Sutures removed today, steri-strips applied. Continue non-weight bearing on the right foot. CAM boot for stability.     -He will follow-up with me in 3 weeks for repeat weight bearing x-rays on the right foot.    Donavon Ferrer DPM  [facility] Podiatry/Foot & Ankle Surgery      HPI: Jono Bearden was seen again today s/p ORIF 5th metatarsal right foot. Doing well today. He has remained non-weight bearing on the right foot.    Past Medical History:   Diagnosis Date     Hypertension        Allergies   Allergen Reactions     Ampicillin Unknown     As child-unknown reaction         Current Outpatient Medications:      lisinopril (ZESTRIL) 10 MG tablet, Take 1 tablet (10 mg) by mouth daily, Disp: 90 tablet, Rfl: 3     tretinoin (RETIN-A) 0.1 % external cream, Apply topically At Bedtime, Disp: 45 g, Rfl: 6    Review of Systems - Negative       OBJECTIVE:  Appearance: alert, well appearing, and in no distress.    Pulse 82   Ht 1.727 m (5' 8\")   Wt 77.1 kg (170 lb)   SpO2 99%   BMI 25.85 kg/m      @LDAVASC(10,16,17)@    No images are attached to the encounter.     Surgical site on the lateral right foot has intact sutures with skin edges well coapted, no gapping noted. No erythema right foot. Neurovascular status unchanged right foot.     "

## 2022-04-18 NOTE — LETTER
"    4/18/2022         RE: Jono Bearden  [address] [facility] [address]        Dear Colleague,    Thank you for referring your patient, Jono Bearden, to the [facility]. Please see a copy of my visit note below.    Podiatry Progress Note        ASSESSMENT: S/P ORIF 5th metatarsal right foot       TREATMENT:  -Surgical site on the right foot is progressing well.     -I reviewed the patient's x-rays which indicate reduction of the 5th metatarsal fracture with plate and screw fixation and bony healing.     -Sutures removed today, steri-strips applied. Continue non-weight bearing on the right foot. CAM boot for stability.     -He will follow-up with me in 3 weeks for repeat weight bearing x-rays on the right foot.    Donavon Ferrer DPM  [facility] Podiatry/Foot & Ankle Surgery      HPI: Jono Bearden was seen again today s/p ORIF 5th metatarsal right foot. Doing well today. He has remained non-weight bearing on the right foot.    Past Medical History:   Diagnosis Date     Hypertension        Allergies   Allergen Reactions     Ampicillin Unknown     As child-unknown reaction         Current Outpatient Medications:      lisinopril (ZESTRIL) 10 MG tablet, Take 1 tablet (10 mg) by mouth daily, Disp: 90 tablet, Rfl: 3     tretinoin (RETIN-A) 0.1 % external cream, Apply topically At Bedtime, Disp: 45 g, Rfl: 6    Review of Systems - Negative       OBJECTIVE:  Appearance: alert, well appearing, and in no distress.    Pulse 82   Ht 1.727 m (5' 8\")   Wt 77.1 kg (170 lb)   SpO2 99%   BMI 25.85 kg/m      @LDAVASC(10,16,17)@    No images are attached to the encounter.     Surgical site on the lateral right foot has intact sutures with skin edges well coapted, no gapping noted. No erythema right foot. Neurovascular status unchanged right foot.         Again, thank you for allowing me to participate in the care of your patient.        Sincerely,        Donavon Ferrer DPM    "

## 2022-05-09 ENCOUNTER — HOSPITAL ENCOUNTER (OUTPATIENT)
Dept: GENERAL RADIOLOGY | Facility: HOSPITAL | Age: 52
Discharge: HOME | End: 2022-05-09
Attending: PODIATRIST
Payer: COMMERCIAL

## 2022-05-09 ENCOUNTER — OFFICE VISIT (OUTPATIENT)
Dept: PODIATRY | Facility: CLINIC | Age: 52
End: 2022-05-09

## 2022-05-09 VITALS — SYSTOLIC BLOOD PRESSURE: 122 MMHG | DIASTOLIC BLOOD PRESSURE: 64 MMHG | HEART RATE: 71 BPM

## 2022-05-09 DIAGNOSIS — S92.351A CLOSED DISPLACED FRACTURE OF FIFTH METATARSAL BONE OF RIGHT FOOT, INITIAL ENCOUNTER: Primary | ICD-10-CM

## 2022-05-09 DIAGNOSIS — S92.351A CLOSED DISPLACED FRACTURE OF FIFTH METATARSAL BONE OF RIGHT FOOT, INITIAL ENCOUNTER: ICD-10-CM

## 2022-05-09 PROCEDURE — 99024 POSTOP FOLLOW-UP VISIT: CPT | Performed by: PODIATRIST

## 2022-05-09 PROCEDURE — 999N000065 XR FOOT 3 VIEWS STANDING RIGHT: Mod: RT,FY

## 2022-05-09 PROCEDURE — 73630 X-RAY EXAM OF FOOT: CPT | Mod: 26 | Performed by: PODIATRIST

## 2022-05-09 ASSESSMENT — PAIN SCALES - GENERAL: PAINLEVEL: NO PAIN (0)

## 2022-05-09 NOTE — PROGRESS NOTES
Podiatry Progress Note        ASSESSMENT: S/P ORIF 5th metatarsal right foot       TREATMENT:  -Surgical site on the right foot is progressing well.     -I reviewed the patient's x-rays which indicate reduction of the 5th metatarsal fracture with plate and screw fixation and bony healing.     -Continue non-weight bearing on the right foot. CAM boot for stability.     -He will follow-up with me in 3 weeks for repeat weight bearing x-rays on the right foot.    Donavon Ferrer DPM  [facility] Podiatry/Foot & Ankle Surgery      HPI: Jono Bearden was seen again today s/p ORIF 5th metatarsal right foot. Doing well today. He has remained non-weight bearing on the right foot.    Past Medical History:   Diagnosis Date     Hypertension        Allergies   Allergen Reactions     Ampicillin Unknown     As child-unknown reaction         Current Outpatient Medications:      lisinopril (ZESTRIL) 10 MG tablet, Take 1 tablet (10 mg) by mouth daily, Disp: 90 tablet, Rfl: 3     tretinoin (RETIN-A) 0.1 % external cream, Apply topically At Bedtime, Disp: 45 g, Rfl: 6    Review of Systems - Negative       OBJECTIVE:  Appearance: alert, well appearing, and in no distress.    /64   Pulse 71     @LDAVASC(10,16,17)@    No images are attached to the encounter.     Surgical site on the lateral right foot has with skin edges well coapted, no gapping noted. No erythema right foot. Neurovascular status unchanged right foot.

## 2022-05-09 NOTE — LETTER
5/9/2022         RE: Jono Bearden  [address] [facility] [address]        Dear Colleague,    Thank you for referring your patient, Jono Bearden, to the [facility]. Please see a copy of my visit note below.    Podiatry Progress Note        ASSESSMENT: S/P ORIF 5th metatarsal right foot       TREATMENT:  -Surgical site on the right foot is progressing well.     -I reviewed the patient's x-rays which indicate reduction of the 5th metatarsal fracture with plate and screw fixation and bony healing.     -Continue non-weight bearing on the right foot. CAM boot for stability.     -He will follow-up with me in 3 weeks for repeat weight bearing x-rays on the right foot.    Donavon Ferrer DPM  [facility] Podiatry/Foot & Ankle Surgery      HPI: Jono Bearden was seen again today s/p ORIF 5th metatarsal right foot. Doing well today. He has remained non-weight bearing on the right foot.    Past Medical History:   Diagnosis Date     Hypertension        Allergies   Allergen Reactions     Ampicillin Unknown     As child-unknown reaction         Current Outpatient Medications:      lisinopril (ZESTRIL) 10 MG tablet, Take 1 tablet (10 mg) by mouth daily, Disp: 90 tablet, Rfl: 3     tretinoin (RETIN-A) 0.1 % external cream, Apply topically At Bedtime, Disp: 45 g, Rfl: 6    Review of Systems - Negative       OBJECTIVE:  Appearance: alert, well appearing, and in no distress.    /64   Pulse 71     @LDAVASC(10,16,17)@    No images are attached to the encounter.     Surgical site on the lateral right foot has with skin edges well coapted, no gapping noted. No erythema right foot. Neurovascular status unchanged right foot.         Again, thank you for allowing me to participate in the care of your patient.        Sincerely,        Donavon Ferrer DPM

## 2022-05-09 NOTE — PATIENT INSTRUCTIONS
Remain non-weight bearing on your right leg.  Non-weight bearing means that NO weight can be placed on the affected leg. This is the most restrictive of all weight-bearing limitations. Since you are not able to bear any weight on the leg, an assistive device, such as a walker or crutches, will be necessary for you to walk.  When walking with your walker or crutches, keep your affected knee bent up and keep your toes off the floor. No weight means no weight; even the slightest bit of pressure through your leg can cause problems.  - You will need to get an x-ray done before your follow up. This can be at any of the [facility] or at [facility] located downstairs on the first floor in the [facility]. No appointment is needed but depending on availability you may have to wait.       [address] [facility] [address] [address]   Monday through Friday 7 am to 10 pm, Saturday and Sunday 8 am to 4:40 pm         An X-ray uses a small amount of radiation to create images of your bones and internal organs. X-rays are most often used to detect bone or joint problems, or to check the heart and lungs (chest X-ray).   Let the technologist know   Tell the technologist if you:   Are or may be pregnant   Have had an X-ray of this part of your body before   Have metal in the part of your body being imaged      Before Your Test   You may be asked to remove your watch, jewelry, or garments with metal closures from the part of your body being imaged. These items can block part of the image.   You may be asked to put on a gown.   You may be asked about your overall health or any medications you take.  During Your Test   You will be asked to lie on a table, sit, or stand.   A lead apron may be draped over part of your body to shield it from the X-rays.   With an X-ray of your chest or abdomen, you will have to take a deep breath and hold it for a few seconds.   Each exam usually requires at  least 2 X-rays. You will need to move your body before each new X-ray.  After Your Test   Your doctor will discuss the test results with you during a follow-up appointment or over the phone.     [id number] [employer] [address] [place] [address], [place] [address]. All rights reserved. This information is not intended as a substitute for professional medical care. Always follow your healthcare professional's instructions.

## 2022-06-03 ENCOUNTER — HOSPITAL ENCOUNTER (OUTPATIENT)
Dept: GENERAL RADIOLOGY | Facility: HOSPITAL | Age: 52
Discharge: HOME | End: 2022-06-03
Attending: PODIATRIST | Admitting: PODIATRIST
Payer: COMMERCIAL

## 2022-06-03 ENCOUNTER — OFFICE VISIT (OUTPATIENT)
Dept: PODIATRY | Facility: CLINIC | Age: 52
End: 2022-06-03
Payer: COMMERCIAL

## 2022-06-03 VITALS — TEMPERATURE: 99.1 F | WEIGHT: 170 LBS | HEART RATE: 72 BPM | BODY MASS INDEX: 25.85 KG/M2

## 2022-06-03 DIAGNOSIS — S92.351A CLOSED DISPLACED FRACTURE OF FIFTH METATARSAL BONE OF RIGHT FOOT, INITIAL ENCOUNTER: ICD-10-CM

## 2022-06-03 DIAGNOSIS — S92.351A CLOSED DISPLACED FRACTURE OF FIFTH METATARSAL BONE OF RIGHT FOOT, INITIAL ENCOUNTER: Primary | ICD-10-CM

## 2022-06-03 PROCEDURE — 99024 POSTOP FOLLOW-UP VISIT: CPT | Performed by: PODIATRIST

## 2022-06-03 PROCEDURE — 73630 X-RAY EXAM OF FOOT: CPT | Mod: RT,FY

## 2022-06-03 PROCEDURE — 73630 X-RAY EXAM OF FOOT: CPT | Mod: 26 | Performed by: PODIATRIST

## 2022-06-03 NOTE — LETTER
6/3/2022         RE: Jono Bearden  [address] [facility] [address]        Dear Colleague,    Thank you for referring your patient, Jono Bearden, to the [facility]. Please see a copy of my visit note below.    Podiatry Progress Note        ASSESSMENT: S/P ORIF 5th metatarsal right foot       TREATMENT:  -Surgical site on the right foot is progressing well.     -I reviewed the patient's x-rays which indicate reduction of the 5th metatarsal fracture with plate and screw fixation and bony consolidation.     -I recommend he begin walking in the CAM boot x2 weeks, then transition to regular shoes. He will monitor for discomfort and return to see me should this occur.    -He is discharged from my care and will follow-up with me as concerns develop.    Donavon Ferrer, ALEKSANDRA  [facility] Podiatry/Foot & Ankle Surgery      HPI: Jono Bearden was seen again today s/p ORIF 5th metatarsal right foot. Doing well today. He has remained non-weight bearing on the right foot.    Past Medical History:   Diagnosis Date     Hypertension        Allergies   Allergen Reactions     Ampicillin Unknown     As child-unknown reaction         Current Outpatient Medications:      lisinopril (ZESTRIL) 10 MG tablet, Take 1 tablet (10 mg) by mouth daily, Disp: 90 tablet, Rfl: 3     tretinoin (RETIN-A) 0.1 % external cream, Apply topically At Bedtime, Disp: 45 g, Rfl: 6    Review of Systems - Negative       OBJECTIVE:  Appearance: alert, well appearing, and in no distress.    Pulse 72   Temp 99.1  F (37.3  C)   Wt 77.1 kg (170 lb)   BMI 25.85 kg/m      @LDAVASC(10,16,17)@    No images are attached to the encounter.     Surgical site on the lateral right foot has with skin edges well coapted, no gapping noted. No erythema right foot. Neurovascular status unchanged right foot. No pain along 5th metatarsal right foot.         Again, thank you for allowing me to participate in the care of your patient.         Sincerely,        Donavon Ferrer DPM

## 2022-06-03 NOTE — PROGRESS NOTES
Podiatry Progress Note        ASSESSMENT: S/P ORIF 5th metatarsal right foot       TREATMENT:  -Surgical site on the right foot is progressing well.     -I reviewed the patient's x-rays which indicate reduction of the 5th metatarsal fracture with plate and screw fixation and bony consolidation.     -I recommend he begin walking in the CAM boot x2 weeks, then transition to regular shoes. He will monitor for discomfort and return to see me should this occur.    -He is discharged from my care and will follow-up with me as concerns develop.    Donavon Ferrer DPM  [facility] Podiatry/Foot & Ankle Surgery      HPI: Jono Bearden was seen again today s/p ORIF 5th metatarsal right foot. Doing well today. He has remained non-weight bearing on the right foot.    Past Medical History:   Diagnosis Date     Hypertension        Allergies   Allergen Reactions     Ampicillin Unknown     As child-unknown reaction         Current Outpatient Medications:      lisinopril (ZESTRIL) 10 MG tablet, Take 1 tablet (10 mg) by mouth daily, Disp: 90 tablet, Rfl: 3     tretinoin (RETIN-A) 0.1 % external cream, Apply topically At Bedtime, Disp: 45 g, Rfl: 6    Review of Systems - Negative       OBJECTIVE:  Appearance: alert, well appearing, and in no distress.    Pulse 72   Temp 99.1  F (37.3  C)   Wt 77.1 kg (170 lb)   BMI 25.85 kg/m      @LDAVASC(10,16,17)@    No images are attached to the encounter.     Surgical site on the lateral right foot has with skin edges well coapted, no gapping noted. No erythema right foot. Neurovascular status unchanged right foot. No pain along 5th metatarsal right foot.

## 2022-06-03 NOTE — PATIENT INSTRUCTIONS
OK to walk in your Cam boot for the next 2 weeks    After 6/17/22 OK to switch back to regular shoes.

## 2022-08-06 ENCOUNTER — HEALTH MAINTENANCE LETTER (OUTPATIENT)
Age: 52
End: 2022-08-06

## 2022-10-22 ENCOUNTER — HEALTH MAINTENANCE LETTER (OUTPATIENT)
Age: 52
End: 2022-10-22

## 2023-01-25 DIAGNOSIS — Z76.0 ENCOUNTER FOR MEDICATION REFILL: Primary | ICD-10-CM

## 2023-01-25 DIAGNOSIS — I10 ESSENTIAL HYPERTENSION: ICD-10-CM

## 2023-01-25 RX ORDER — LISINOPRIL 10 MG/1
10 TABLET ORAL DAILY
Qty: 90 TABLET | Refills: 3 | Status: SHIPPED | OUTPATIENT
Start: 2023-01-25 | End: 2024-01-23

## 2023-08-27 ENCOUNTER — HEALTH MAINTENANCE LETTER (OUTPATIENT)
Age: 53
End: 2023-08-27

## 2024-01-23 ENCOUNTER — MYC REFILL (OUTPATIENT)
Dept: FAMILY MEDICINE | Facility: CLINIC | Age: 54
End: 2024-01-23
Payer: COMMERCIAL

## 2024-01-23 DIAGNOSIS — I10 ESSENTIAL HYPERTENSION: ICD-10-CM

## 2024-01-23 DIAGNOSIS — Z76.0 ENCOUNTER FOR MEDICATION REFILL: ICD-10-CM

## 2024-01-24 RX ORDER — LISINOPRIL 10 MG/1
10 TABLET ORAL DAILY
Qty: 90 TABLET | Refills: 3 | Status: SHIPPED | OUTPATIENT
Start: 2024-01-24

## 2024-03-01 SDOH — HEALTH STABILITY: PHYSICAL HEALTH: ON AVERAGE, HOW MANY MINUTES DO YOU ENGAGE IN EXERCISE AT THIS LEVEL?: 20 MIN

## 2024-03-01 SDOH — HEALTH STABILITY: PHYSICAL HEALTH: ON AVERAGE, HOW MANY DAYS PER WEEK DO YOU ENGAGE IN MODERATE TO STRENUOUS EXERCISE (LIKE A BRISK WALK)?: 1 DAY

## 2024-03-01 ASSESSMENT — SOCIAL DETERMINANTS OF HEALTH (SDOH): HOW OFTEN DO YOU GET TOGETHER WITH FRIENDS OR RELATIVES?: TWICE A WEEK

## 2024-03-04 ENCOUNTER — OFFICE VISIT (OUTPATIENT)
Dept: FAMILY MEDICINE | Facility: CLINIC | Age: 54
End: 2024-03-04
Payer: COMMERCIAL

## 2024-03-04 VITALS
SYSTOLIC BLOOD PRESSURE: 126 MMHG | OXYGEN SATURATION: 97 % | HEIGHT: 67 IN | RESPIRATION RATE: 14 BRPM | TEMPERATURE: 98.3 F | DIASTOLIC BLOOD PRESSURE: 68 MMHG | BODY MASS INDEX: 27.47 KG/M2 | HEART RATE: 65 BPM | WEIGHT: 175 LBS

## 2024-03-04 DIAGNOSIS — Z01.84 IMMUNITY STATUS TESTING: ICD-10-CM

## 2024-03-04 DIAGNOSIS — Z11.59 NEED FOR HEPATITIS C SCREENING TEST: ICD-10-CM

## 2024-03-04 DIAGNOSIS — Z12.11 SCREEN FOR COLON CANCER: ICD-10-CM

## 2024-03-04 DIAGNOSIS — E66.3 OVERWEIGHT: ICD-10-CM

## 2024-03-04 DIAGNOSIS — Z00.00 ENCOUNTER FOR ANNUAL PHYSICAL EXAM: Primary | ICD-10-CM

## 2024-03-04 DIAGNOSIS — Z87.442 HISTORY OF KIDNEY STONES: ICD-10-CM

## 2024-03-04 DIAGNOSIS — Z12.5 SCREENING FOR PROSTATE CANCER: ICD-10-CM

## 2024-03-04 DIAGNOSIS — Z11.4 SCREENING FOR HIV (HUMAN IMMUNODEFICIENCY VIRUS): ICD-10-CM

## 2024-03-04 DIAGNOSIS — I10 ESSENTIAL HYPERTENSION: ICD-10-CM

## 2024-03-04 DIAGNOSIS — Z23 NEED FOR HEPATITIS B VACCINATION: ICD-10-CM

## 2024-03-04 LAB
ALBUMIN SERPL BCG-MCNC: 4.5 G/DL (ref 3.5–5.2)
ALP SERPL-CCNC: 71 U/L (ref 40–150)
ALT SERPL W P-5'-P-CCNC: 31 U/L (ref 0–70)
ANION GAP SERPL CALCULATED.3IONS-SCNC: 9 MMOL/L (ref 7–15)
AST SERPL W P-5'-P-CCNC: 26 U/L (ref 0–45)
BASOPHILS # BLD AUTO: 0 10E3/UL (ref 0–0.2)
BASOPHILS NFR BLD AUTO: 0 %
BILIRUB DIRECT SERPL-MCNC: <0.2 MG/DL (ref 0–0.3)
BILIRUB SERPL-MCNC: 0.7 MG/DL
BUN SERPL-MCNC: 14.1 MG/DL (ref 6–20)
CALCIUM SERPL-MCNC: 9.8 MG/DL (ref 8.6–10)
CHLORIDE SERPL-SCNC: 104 MMOL/L (ref 98–107)
CHOLEST SERPL-MCNC: 215 MG/DL
CREAT SERPL-MCNC: 0.98 MG/DL (ref 0.67–1.17)
DEPRECATED HCO3 PLAS-SCNC: 30 MMOL/L (ref 22–29)
EGFRCR SERPLBLD CKD-EPI 2021: >90 ML/MIN/1.73M2
EOSINOPHIL # BLD AUTO: 0.1 10E3/UL (ref 0–0.7)
EOSINOPHIL NFR BLD AUTO: 2 %
ERYTHROCYTE [DISTWIDTH] IN BLOOD BY AUTOMATED COUNT: 12.1 % (ref 10–15)
FASTING STATUS PATIENT QL REPORTED: YES
GLUCOSE SERPL-MCNC: 110 MG/DL (ref 70–99)
HBV SURFACE AB SERPL IA-ACNC: <3.5 M[IU]/ML
HBV SURFACE AB SERPL IA-ACNC: NONREACTIVE M[IU]/ML
HCT VFR BLD AUTO: 48.1 % (ref 40–53)
HCV AB SERPL QL IA: NONREACTIVE
HDLC SERPL-MCNC: 57 MG/DL
HGB BLD-MCNC: 17.2 G/DL (ref 13.3–17.7)
HIV 1+2 AB+HIV1 P24 AG SERPL QL IA: NONREACTIVE
IMM GRANULOCYTES # BLD: 0 10E3/UL
IMM GRANULOCYTES NFR BLD: 0 %
LDLC SERPL CALC-MCNC: 121 MG/DL
LYMPHOCYTES # BLD AUTO: 1.6 10E3/UL (ref 0.8–5.3)
LYMPHOCYTES NFR BLD AUTO: 35 %
MCH RBC QN AUTO: 33.9 PG (ref 26.5–33)
MCHC RBC AUTO-ENTMCNC: 35.8 G/DL (ref 31.5–36.5)
MCV RBC AUTO: 95 FL (ref 78–100)
MONOCYTES # BLD AUTO: 0.4 10E3/UL (ref 0–1.3)
MONOCYTES NFR BLD AUTO: 8 %
NEUTROPHILS # BLD AUTO: 2.6 10E3/UL (ref 1.6–8.3)
NEUTROPHILS NFR BLD AUTO: 55 %
NONHDLC SERPL-MCNC: 158 MG/DL
PLATELET # BLD AUTO: 248 10E3/UL (ref 150–450)
POTASSIUM SERPL-SCNC: 4.8 MMOL/L (ref 3.4–5.3)
PROT SERPL-MCNC: 7.4 G/DL (ref 6.4–8.3)
PSA SERPL DL<=0.01 NG/ML-MCNC: 1.19 NG/ML (ref 0–3.5)
RBC # BLD AUTO: 5.07 10E6/UL (ref 4.4–5.9)
SODIUM SERPL-SCNC: 143 MMOL/L (ref 135–145)
TRIGL SERPL-MCNC: 184 MG/DL
WBC # BLD AUTO: 4.7 10E3/UL (ref 4–11)

## 2024-03-04 PROCEDURE — 87389 HIV-1 AG W/HIV-1&-2 AB AG IA: CPT | Performed by: FAMILY MEDICINE

## 2024-03-04 PROCEDURE — 85025 COMPLETE CBC W/AUTO DIFF WBC: CPT | Performed by: FAMILY MEDICINE

## 2024-03-04 PROCEDURE — 99396 PREV VISIT EST AGE 40-64: CPT | Performed by: FAMILY MEDICINE

## 2024-03-04 PROCEDURE — 86803 HEPATITIS C AB TEST: CPT | Performed by: FAMILY MEDICINE

## 2024-03-04 PROCEDURE — 99214 OFFICE O/P EST MOD 30 MIN: CPT | Mod: 25 | Performed by: FAMILY MEDICINE

## 2024-03-04 PROCEDURE — G0103 PSA SCREENING: HCPCS | Performed by: FAMILY MEDICINE

## 2024-03-04 PROCEDURE — 86706 HEP B SURFACE ANTIBODY: CPT | Performed by: FAMILY MEDICINE

## 2024-03-04 PROCEDURE — 36415 COLL VENOUS BLD VENIPUNCTURE: CPT | Performed by: FAMILY MEDICINE

## 2024-03-04 PROCEDURE — 80053 COMPREHEN METABOLIC PANEL: CPT | Performed by: FAMILY MEDICINE

## 2024-03-04 PROCEDURE — 80061 LIPID PANEL: CPT | Performed by: FAMILY MEDICINE

## 2024-03-04 PROCEDURE — 82248 BILIRUBIN DIRECT: CPT | Performed by: FAMILY MEDICINE

## 2024-03-04 NOTE — PROGRESS NOTES
Preventive Care Visit  [facility] HARVINDER Cooley MD, Family Medicine  Mar 4, 2024    Assessment & Plan     Patient Instructions:    -You are doing great.  -Continue the lisinopril as prescribed.   -Continue to eat well.  Try to increase your servings of calcium as this can help your bones stay strong and healthy.  Follow a nutrition plan patient fruits and vegetables and low in fats and cholesterol.    -Be sure to eat 5-7 servings of fruits and vegetables each day.  -Find ways to stay active.  Try to get 150 minutes of moderate activity (where you are breathing faster and slightly sweating) each week.  -Try to maintain a body mass index (BMI) of 18.5-25 as this is considered a healthier weight range.  -Brush your teeth twice daily.  See a dentist every 6-12 months.  -Be sure to use sunblock with SPF 15 or greater when going outside for extended periods of time.  Sunblock should be used even when it is a cloudy day.  Do intermittent skin checks for any concerning skin changes.  Wearing a wide brimmed hat and sunglasses can also be helpful to protect your skin from the sun.  -Monitor for any abnormal skin changes (such as new moles/spots, painful moles, changes in your old moles, wounds that will not heal, multiple colors noted in one lesion, lesions that are asymmetric or not circular, or anything that is concerning for you). If any of these are noted, please schedule an appointment to be seen.     -It is generally recommended for you to complete a health care directive or living will. These documents will be able to reflect your wishes and desire in the case that you are unable to express them yourself. Please let Dr. Cooley know if you would like some assistance with this process.    -For the preventive health procedure (such as colonoscopy, mammogram, etc) order from today, if you do not hear within a week's time from the specialist to schedule an appointment, please call the [facility]  and speak with the specialty  to help you schedule the appointment. Depending on the specialist availability, it may be a number of weeks prior to your scheduled appointment.    -The Shingles/Shingrix vaccine is generally better covered by insurance companies if the vaccine is received at a pharmacy.  Please speak with your pharmacist regarding this vaccination as it is recommended for you.    For the itchy spot on the low back area:   -Use OTC hydrocortisone 1% cream and apply twice daily as needed for itching on the low back area.   -Continue to moisturize as well.   -Do NOT scratch the area as it can make things worse.     -Let Dr. Cooley know if you want to do the colonoscopy or the cologaurd test.     Please seek immediate medical attention (go to the emergency room or urgent care) for the following reasons: worsening symptoms, or any concerning changes.    Dennys was seen today for physical.  Diagnoses and all orders for this visit:    Encounter for annual physical exam  Overweight  -     Basic metabolic panel; Future  -     CBC with Platelets & Differential; Future  -     Hepatic function panel; Future  -     Lipid panel reflex to direct LDL Fasting; Future    Essential hypertension: Stable.  Continue lisinopril as prescribed.    Screen for colon cancer: Cologuard test ordered.    Screening for HIV (human immunodeficiency virus)  -     HIV Antigen Antibody Combo; Future    Need for hepatitis C screening test  -     Hepatitis C Screen Reflex to HCV RNA Quant and Genotype; Future    Screening for prostate cancer  -     Prostate Specific Antigen Screen; Future    Immunity status testing  -     Hepatitis B Surface Antibody; Future    History of kidney stones: Doing well at this time.  Continue to monitor.  -     Basic metabolic panel; Future  -     CBC with Platelets & Differential; Future  -     Hepatic function panel; Future  -     Lipid panel reflex to direct LDL Fasting; Future    Need for hepatitis B  "vaccination: Nonimmune based on titers from 3/4/2024.  Orders placed as below.  -     HEPATITIS B, ADULT 20+ (ENGERIX-B/RECOMBIVAX HB); Future  -     HEPATITIS B, ADULT 20+ (ENGERIX-B/RECOMBIVAX HB); Future  -     HEPATITIS B, ADULT 20+ (ENGERIX-B/RECOMBIVAX HB); Future        BMI  Estimated body mass index is 27.6 kg/m  as calculated from the following:    Height as of this encounter: 1.696 m (5' 6.77\").    Weight as of this encounter: 79.4 kg (175 lb).   See AVS.    Counseling  Appropriate preventive services were discussed with this patient, including applicable screening as appropriate for fall prevention, nutrition, physical activity, Tobacco-use cessation, weight loss and cognition.  Checklist reviewing preventive services available has been given to the patient.  Reviewed patient's diet, addressing concerns and/or questions.   He is at risk for lack of exercise and has been provided with information to increase physical activity for the benefit of his well-being.         Iain Noyola is a 53 year old, presenting for the following:  Physical        3/4/2024     9:40 AM   Additional Questions   Roomed by keya snell        Health Care Directive  Patient does not have a Health Care Directive or Living Will: Discussed advance care planning with patient; information given to patient to review.    HPI    HTN: doing well. No problems noted. BP is stable.     Prostate: \"normally enlarged prostate for age\" per urology when checked in 2013. Father has a history of BPH needing intervention. Not cancer for father. Denies urination issues that is significantly different. Sometimes has dribbling at the end of urinating.       -Reviewed healthy eating and exercise. He eats well mostly. In the last year or so, he has been more sedentary than he would like due to a project he is working on.   -Skin cancer screening: No concerning skin changes expressed by patient. In the winter time, he gets itching the small of the back. He " puts lotions to moisten it and it helps a bit.   -Smoking status:   Tobacco Use      Smoking status: Never      Smokeless tobacco: Never      -Family history:  Family History   Problem Relation Age of Onset    Dementia Mother     Hypertension Mother     No Known Problems Father     Heart Disease Maternal Grandfather     Breast Cancer Paternal Grandmother     Heart Disease Paternal Grandfather     Breast Cancer Maternal Aunt        Preventative health recommendations, evaluation options, and risk/benefits of each were discussed with patient. Accepted recommendations were ordered. Otherwise, patient declined.  Health Maintenance Due   Topic Date Due    ANNUAL REVIEW OF HM ORDERS  Never done    ADVANCE CARE PLANNING  Never done    COLORECTAL CANCER SCREENING  Never done    HIV SCREENING  Never done    HEPATITIS C SCREENING  Never done    HEPATITIS B IMMUNIZATION (1 of 3 - 19+ 3-dose series) Never done    ZOSTER IMMUNIZATION (1 of 2) Never done    YEARLY PREVENTIVE VISIT  09/17/2020       -Immunizations due were reviewed.    Immunization History   Administered Date(s) Administered    COVID PFIZER HISTORICAL 10/12/2023    COVID-19 12+ (2023-24) (Pfizer) 10/12/2023    COVID-19 Bivalent 12+ (Pfizer) 11/18/2022    COVID-19 MONOVALENT 12+ (Pfizer) 04/07/2021, 04/28/2021    COVID-19 Monovalent 12+ (Pfizer 2022) 07/05/2022    COVID-19 Monovalent 18+ (Moderna) 12/03/2021    Flu, Unspecified 01/05/2016, 10/12/2021, 10/11/2023    Influenza (IIV3) PF 12/30/2013, 10/12/2021    Influenza Vaccine >6 months,quad, PF 10/23/2020, 11/18/2022, 10/11/2023    Influenza Vaccine, 6+MO IM (QUADRIVALENT W/PRESERVATIVES) 01/05/2016, 09/17/2019    TD,PF 7+ (Tenivac) 08/22/2005    TDAP (Adacel,Boostrix) 06/30/2014       -Labs: Laboratory recommendations reviewed with patient.    -Colon cancer screening: Last colonoscopy: Never. At age 48 years, he did the cologuard test and it was negative. He knows he is due at this time. No family history of  colon cancer.  Cologuard ordered.  He will think about the colonoscopy.          3/1/2024   General Health   How would you rate your overall physical health? Good   Feel stress (tense, anxious, or unable to sleep) Not at all         3/1/2024   Nutrition   Three or more servings of calcium each day? Yes   Diet: Regular (no restrictions)   How many servings of fruit and vegetables per day? (!) 2-3   How many sweetened beverages each day? 0-1         3/1/2024   Exercise   Days per week of moderate/strenous exercise 1 day   Average minutes spent exercising at this level 20 min   (!) EXERCISE CONCERN      3/1/2024   Social Factors   Frequency of gathering with friends or relatives Twice a week   Worry food won't last until get money to buy more No   Food not last or not have enough money for food? No   Do you have housing?  Yes   Are you worried about losing your housing? No   Lack of transportation? No   Unable to get utilities (heat,electricity)? No         3/1/2024   Fall Risk   Fallen 2 or more times in the past year? No   Trouble with walking or balance? No          3/1/2024   Dental   Dentist two times every year? Yes         3/1/2024   TB Screening   Were you born outside of [place]?  No         Today's PHQ-2 Score:       3/4/2024     9:34 AM   PHQ-2 ( 1999 Pfizer)   Q1: Little interest or pleasure in doing things 0   Q2: Feeling down, depressed or hopeless 0   PHQ-2 Score 0   Q1: Little interest or pleasure in doing things Not at all   Q2: Feeling down, depressed or hopeless Not at all   PHQ-2 Score 0           3/1/2024   Substance Use   Alcohol more than 3/day or more than 7/wk No   Do you use any other substances recreationally? No     Social History     Tobacco Use    Smoking status: Never    Smokeless tobacco: Never   Vaping Use    Vaping Use: Never used   Substance Use Topics    Alcohol use: Yes     Comment: Alcoholic Drinks/day: moderate    Drug use: No             3/1/2024   One time HIV Screening   Previous  "HIV test? I don't know         3/1/2024   STI Screening   New sexual partner(s) since last STI/HIV test? No   Last PSA:   Prostate Specific Antigen Screen   Date Value Ref Range Status   09/17/2019 0.5 0.0 - 3.5 ng/mL Final     ASCVD Risk   The 10-year ASCVD risk score (Tigre DUEÑAS, et al., 2019) is: 5.4%    Values used to calculate the score:      Age: 53 years      Sex: Male      Is Non- : No      Diabetic: No      Tobacco smoker: No      Systolic Blood Pressure: 126 mmHg      Is BP treated: Yes      HDL Cholesterol: 60 mg/dL      Total Cholesterol: 199 mg/dL      Reviewed and updated as needed this visit by Provider     Meds    Surg Hx  Fam Hx            Past Medical History:   Diagnosis Date    Hypertension      Past Surgical History:   Procedure Laterality Date    LEG SURGERY      For a Fracture    OPEN REDUCTION INTERNAL FIXATION FOOT Right 03/28/2022    Procedure: OPEN REDUCTION INTERNAL FIXATION, fifth metatarsal right foot;  Surgeon: Donavon Ferrer DPM;  [facility]: [facility]       The 10 point review of system was negative unless otherwise stated in the HPI.       Objective    Exam  /68   Pulse 65   Temp 98.3  F (36.8  C) (Oral)   Resp 14   Ht 1.696 m (5' 6.77\")   Wt 79.4 kg (175 lb)   SpO2 97%   BMI 27.60 kg/m     Estimated body mass index is 27.6 kg/m  as calculated from the following:    Height as of this encounter: 1.696 m (5' 6.77\").    Weight as of this encounter: 79.4 kg (175 lb).    Physical Exam  GENERAL: alert and no distress  EYES: Eyes grossly normal to inspection, PERRL and conjunctivae and sclerae normal  HENT: ear canals and TM's normal, nose and mouth without ulcers or lesions  NECK: no adenopathy, no asymmetry, masses, or scars  RESP: lungs clear to auscultation - no rales, rhonchi or wheezes  CV: regular rate and rhythm, normal S1 S2, no S3 or S4, no murmur, click or rub, no peripheral edema  ABDOMEN: soft, nontender, no " hepatosplenomegaly, no masses and bowel sounds normal  MS: no gross musculoskeletal defects noted, no edema  SKIN: no suspicious lesions or rashes  NEURO: Normal strength and tone, mentation intact and speech normal  Rectal: Normal sphincter tone.  Prostate is firm without any nodules, bogginess, tenderness to palpation.  No perianal/rectal masses or abnormalities noted.  Prostate is 45cc in size.  PSYCH: mentation appears normal, affect normal/bright      Signed Electronically by: Darrian Cooley MD    Prior to immunization administration, verified patients identity using patient s name and date of birth. Please see Immunization Activity for additional information.     Screening Questionnaire for Adult Immunization    Are you sick today?   No   Do you have allergies to medications, food, a vaccine component or latex?   Yes   Have you ever had a serious reaction after receiving a vaccination?   No   Do you have a long-term health problem with heart, lung, kidney, or metabolic disease (e.g., diabetes), asthma, a blood disorder, no spleen, complement component deficiency, a cochlear implant, or a spinal fluid leak?  Are you on long-term aspirin therapy?   No   Do you have cancer, leukemia, HIV/AIDS, or any other immune system problem?   No   Do you have a parent, brother, or sister with an immune system problem?   No   In the past 3 months, have you taken medications that affect  your immune system, such as prednisone, other steroids, or anticancer drugs; drugs for the treatment of rheumatoid arthritis, Crohn s disease, or psoriasis; or have you had radiation treatments?   No   Have you had a seizure, or a brain or other nervous system problem?   No   During the past year, have you received a transfusion of blood or blood    products, or been given immune (gamma) globulin or antiviral drug?   No   For women: Are you pregnant or is there a chance you could become       pregnant during the next month?   No   Have you  received any vaccinations in the past 4 weeks?   No     Immunization questionnaire was positive for at least one answer.  Notified Dr Cooley.      Patient instructed to remain in clinic for 15 minutes afterwards, and to report any adverse reactions.     Screening performed by Ayde Mchugh MA on 3/4/2024 at 9:45 AM.       Darrian Cooley MD  [facility] [place] [address]  Phone: [phone]  Fax: [phone]    3/5/2024  6:04 PM

## 2024-03-04 NOTE — LETTER
March 15, 2024      Dennys Bearden  [address] [facility] [address]        Dear ,    We are writing to inform you of your test results.    I hope you have been well since our last visit. Below are the results from the testing completed at the visit.      The following results were normal or not concerning: Kidney function, electrolytes, blood counts, prostate test, liver function.      You do not have HIV, hepatitis C.      You do not have immunity to hepatitis B and are recommended to complete the 3 shot hepatitis B vaccine series with nurse only visits when you are ready.      The total cholesterol and LDL or bad cholesterol are elevated.  The triglycerides, which is primarily affected by food, is elevated. The HDL or good cholesterol are in the normal range.  No medications are recommended at this time, though you should try to follow a diet that is rich in fruits and vegetables and low in fats and cholesterol.  In addition, find ways to stay active.  Doing aerobic activities (such as running, biking, etc.) will help improve the HDL or good cholesterol.      Dr. Cooley recommends that you continue on the plan as discussed in clinic.         Resulted Orders   Hepatic function panel   Result Value Ref Range    Protein Total 7.4 6.4 - 8.3 g/dL    Albumin 4.5 3.5 - 5.2 g/dL    Bilirubin Total 0.7 <=1.2 mg/dL    Alkaline Phosphatase 71 40 - 150 U/L      Comment:      Reference intervals for this test were updated on 11/14/2023 to more accurately reflect our healthy population. There may be differences in the flagging of prior results with similar values performed with this method. Interpretation of those prior results can be made in the context of the updated reference intervals.    AST 26 0 - 45 U/L      Comment:      Reference intervals for this test were updated on 6/12/2023 to more accurately reflect our healthy population. There may be differences in the flagging of prior results with similar values performed  with this method. Interpretation of those prior results can be made in the context of the updated reference intervals.    ALT 31 0 - 70 U/L      Comment:      Reference intervals for this test were updated on 6/12/2023 to more accurately reflect our healthy population. There may be differences in the flagging of prior results with similar values performed with this method. Interpretation of those prior results can be made in the context of the updated reference intervals.      Bilirubin Direct <0.20 0.00 - 0.30 mg/dL       If you have any questions or concerns, please call the clinic at the number listed above.       Sincerely,      Darrian Cooley MD

## 2024-03-04 NOTE — PATIENT INSTRUCTIONS
-Thank you for choosing the [facility].  -It was a pleasure to see you today.  -Please take a look at the information below for more specific details regarding the treatment plan and recommendations.  -In this after visit summary is a list of your medications and specific instructions.  Please review this carefully as there may be changes made to your medication list.  -If there are any particular questions or concerns, please feel free to reach out to Dr. Cooley.  -If any labs have been completed, we will reach out to you about results.  If the results are normal or not concerning, a letter or MyChart message will be sent to you.  If any follow-up is needed, either Dr. Cooley or the nurse will give you a call.  If you have not heard regarding results after 2 weeks, please reach out to the clinic.    Patient Instructions:    -You are doing great.  -Continue the lisinopril as prescribed.   -Continue to eat well.  Try to increase your servings of calcium as this can help your bones stay strong and healthy.  Follow a nutrition plan patient fruits and vegetables and low in fats and cholesterol.    -Be sure to eat 5-7 servings of fruits and vegetables each day.  -Find ways to stay active.  Try to get 150 minutes of moderate activity (where you are breathing faster and slightly sweating) each week.  -Try to maintain a body mass index (BMI) of 18.5-25 as this is considered a healthier weight range.  -Brush your teeth twice daily.  See a dentist every 6-12 months.  -Be sure to use sunblock with SPF 15 or greater when going outside for extended periods of time.  Sunblock should be used even when it is a cloudy day.  Do intermittent skin checks for any concerning skin changes.  Wearing a wide brimmed hat and sunglasses can also be helpful to protect your skin from the sun.  -Monitor for any abnormal skin changes (such as new moles/spots, painful moles, changes in your old moles, wounds that will not heal,  multiple colors noted in one lesion, lesions that are asymmetric or not circular, or anything that is concerning for you). If any of these are noted, please schedule an appointment to be seen.     -It is generally recommended for you to complete a health care directive or living will. These documents will be able to reflect your wishes and desire in the case that you are unable to express them yourself. Please let Dr. Cooley know if you would like some assistance with this process.    -For the preventive health procedure (such as colonoscopy, mammogram, etc) order from today, if you do not hear within a week's time from the specialist to schedule an appointment, please call the [facility] and speak with the specialty  to help you schedule the appointment. Depending on the specialist availability, it may be a number of weeks prior to your scheduled appointment.    -The Shingles/Shingrix vaccine is generally better covered by insurance companies if the vaccine is received at a pharmacy.  Please speak with your pharmacist regarding this vaccination as it is recommended for you.    For the itchy spot on the low back area:   -Use OTC hydrocortisone 1% cream and apply twice daily as needed for itching on the low back area.   -Continue to moisturize as well.   -Do NOT scratch the area as it can make things worse.     -Let Dr. Cooley know if you want to do the colonoscopy or the cologaurd test.     Please seek immediate medical attention (go to the emergency room or urgent care) for the following reasons: worsening symptoms, or any concerning changes.      --------------------------------------------------------------------------------------------------------------------    -We are always looking for ways to improve.  You may be selected to receive a survey regarding your visit today.  We encourage you to complete the survey and provide specific, constructive feedback to help us improve our processes.  Thank you  for your time!  -Please review the contact information listed on the after visit summary and in the electronic chart.  Below is the phone number that we have on file.  If there are any changes that are needed to be made, please reach out to the clinic.  [phone] (home)

## 2024-03-05 ENCOUNTER — ORDERS ONLY (AUTO-RELEASED) (OUTPATIENT)
Dept: FAMILY MEDICINE | Facility: CLINIC | Age: 54
End: 2024-03-05
Payer: COMMERCIAL

## 2024-03-05 DIAGNOSIS — Z12.11 SCREEN FOR COLON CANCER: ICD-10-CM

## 2024-03-05 PROBLEM — Z23 NEED FOR HEPATITIS B VACCINATION: Status: ACTIVE | Noted: 2024-03-05

## 2024-03-12 ENCOUNTER — TELEPHONE (OUTPATIENT)
Dept: FAMILY MEDICINE | Facility: CLINIC | Age: 54
End: 2024-03-12
Payer: COMMERCIAL

## 2024-03-12 NOTE — TELEPHONE ENCOUNTER
----- Message from Darrian Cooley MD sent at 3/12/2024 10:08 AM CDT -----  Nurse: please send result note to patient.  It appears that patient has not read the MyChart results.      Jalen Bearden,     I hope you have been well since our last visit. Below are the results from the testing completed at the visit.     The following results were normal or not concerning: Kidney function, electrolytes, blood counts, prostate test, liver function.     You do not have HIV, hepatitis C.     You do not have immunity to hepatitis B and are recommended to complete the 3 shot hepatitis B vaccine series with nurse only visits when you are ready.     The total cholesterol and LDL or bad cholesterol are elevated.  The triglycerides, which is primarily affected by food, is elevated. The HDL or good cholesterol are in the normal range.  No medications are recommended at this time, though you should try to follow a diet that is rich in fruits and vegetables and low in fats and cholesterol.  In addition, find ways to stay active.  Doing aerobic activities (such as running, biking, etc.) will help improve the HDL or good cholesterol.     Dr. Cooley recommends that you continue on the plan as discussed in clinic.     If there are any questions or concerns, please call the clinic or schedule an appointment for follow up.     Best wishes,              Darrian Cooley MD  [facility]  3/5/2024  1:49 PM      Called patient x 1 for test results with no answer. Message left on vm request a return call for the result.  Clinic number provided.    RITA Poe, RN  [facility]

## 2024-03-21 LAB — NONINV COLON CA DNA+OCC BLD SCRN STL QL: NEGATIVE

## 2024-04-06 ENCOUNTER — MYC MEDICAL ADVICE (OUTPATIENT)
Dept: FAMILY MEDICINE | Facility: CLINIC | Age: 54
End: 2024-04-06
Payer: COMMERCIAL

## 2024-07-14 ENCOUNTER — MYC MEDICAL ADVICE (OUTPATIENT)
Dept: FAMILY MEDICINE | Facility: CLINIC | Age: 54
End: 2024-07-14
Payer: COMMERCIAL

## 2024-07-15 NOTE — TELEPHONE ENCOUNTER
Called patient to go over the Covid Treatment protocol. Unable to reach patient, left a voicemail. If patient calls back, offer to go over the Covid treatment protocol with patient.     [employer] message also sent.         Brian Roldan, MSN, RN   [facility]

## 2024-09-23 ENCOUNTER — TELEPHONE (OUTPATIENT)
Dept: FAMILY MEDICINE | Facility: CLINIC | Age: 54
End: 2024-09-23
Payer: COMMERCIAL

## 2024-09-23 NOTE — TELEPHONE ENCOUNTER
Patient Quality Outreach    Patient is due for the following:   Hypertension -  Hypertension follow-up visit    Next Steps:   Schedule a office visit for HTN    Type of outreach:    Sent [employer] message.    Next Steps:  Reach out within 90 days via [employer].    Max number of attempts reached: No. Will try again in 90 days if patient still on fail list.    Questions for provider review:    None           Fidel Morris  Chart routed to Care Team.

## 2024-09-23 NOTE — LETTER
September 23, 2024      Jono Bearden  [address] [facility] [address]      Your healthcare team cares about your health. To provide you with the best care, we have reviewed your chart and based on our findings, we see that you are due to:     HYPERTENSION FOLLOW UP: Office Visit    If you have already completed these items, please contact the clinic via phone or Mychart so your care team can review and update your records.  Thank you for choosing [facility] for your healthcare needs. For any questions, concerns, or to schedule an appointment please contact the clinic.     Healthy Regards,    Your [facility] Team

## 2024-11-06 DIAGNOSIS — Z76.0 ENCOUNTER FOR MEDICATION REFILL: ICD-10-CM

## 2024-11-06 DIAGNOSIS — I10 ESSENTIAL HYPERTENSION: ICD-10-CM

## 2024-11-06 RX ORDER — LISINOPRIL 10 MG/1
10 TABLET ORAL DAILY
Qty: 90 TABLET | Refills: 3 | OUTPATIENT
Start: 2024-11-06

## 2024-11-07 ENCOUNTER — MYC MEDICAL ADVICE (OUTPATIENT)
Dept: FAMILY MEDICINE | Facility: CLINIC | Age: 54
End: 2024-11-07
Payer: COMMERCIAL

## 2024-11-07 DIAGNOSIS — I10 ESSENTIAL HYPERTENSION: ICD-10-CM

## 2024-11-07 DIAGNOSIS — Z76.0 ENCOUNTER FOR MEDICATION REFILL: ICD-10-CM

## 2024-11-07 NOTE — TELEPHONE ENCOUNTER
Called [facility] and confirmed there were remaining 83 tabs left.  Patient picked up and paid out of pocket for 7 tabs extra due to traveling(?). Insurance will not cover 83 tabs but 90.  A new script is required.  Last refill was on 8/3/2024.    Will route encounter to provider to review and approve new script.    Juan Ramon Artis RN  [facility]

## 2024-11-08 RX ORDER — LISINOPRIL 10 MG/1
10 TABLET ORAL DAILY
Qty: 90 TABLET | Refills: 1 | Status: SHIPPED | OUTPATIENT
Start: 2024-11-08

## 2024-11-08 NOTE — TELEPHONE ENCOUNTER
Orders placed as requested. Please call pharmacy to confirm receipt of order.     Call to inform patient of results of above. Please help schedule patient for an annual physical on or after 03/05/2025. We will review medications at that time.     Darrian Cooley MD  [facility]  11/8/2024  7:53 AM    Dennys was seen today for medication request.    Diagnoses and all orders for this visit:    Essential hypertension  -     lisinopril (ZESTRIL) 10 MG tablet; Take 1 tablet (10 mg) by mouth daily.    Encounter for medication refill  -     lisinopril (ZESTRIL) 10 MG tablet; Take 1 tablet (10 mg) by mouth daily.

## 2025-03-25 SDOH — HEALTH STABILITY: PHYSICAL HEALTH: ON AVERAGE, HOW MANY MINUTES DO YOU ENGAGE IN EXERCISE AT THIS LEVEL?: 10 MIN

## 2025-03-25 SDOH — HEALTH STABILITY: PHYSICAL HEALTH: ON AVERAGE, HOW MANY DAYS PER WEEK DO YOU ENGAGE IN MODERATE TO STRENUOUS EXERCISE (LIKE A BRISK WALK)?: 2 DAYS

## 2025-03-25 ASSESSMENT — SOCIAL DETERMINANTS OF HEALTH (SDOH): HOW OFTEN DO YOU GET TOGETHER WITH FRIENDS OR RELATIVES?: ONCE A WEEK

## 2025-03-27 ENCOUNTER — OFFICE VISIT (OUTPATIENT)
Dept: FAMILY MEDICINE | Facility: CLINIC | Age: 55
End: 2025-03-27
Payer: COMMERCIAL

## 2025-03-27 VITALS
RESPIRATION RATE: 14 BRPM | SYSTOLIC BLOOD PRESSURE: 132 MMHG | HEART RATE: 65 BPM | HEIGHT: 67 IN | BODY MASS INDEX: 27.47 KG/M2 | TEMPERATURE: 98.2 F | WEIGHT: 175 LBS | DIASTOLIC BLOOD PRESSURE: 81 MMHG | OXYGEN SATURATION: 99 %

## 2025-03-27 DIAGNOSIS — E78.2 MIXED HYPERLIPIDEMIA: ICD-10-CM

## 2025-03-27 DIAGNOSIS — R73.9 HYPERGLYCEMIA: ICD-10-CM

## 2025-03-27 DIAGNOSIS — Z23 NEED FOR VACCINATION: ICD-10-CM

## 2025-03-27 DIAGNOSIS — Z12.5 SCREENING FOR PROSTATE CANCER: ICD-10-CM

## 2025-03-27 DIAGNOSIS — I10 ESSENTIAL HYPERTENSION: ICD-10-CM

## 2025-03-27 DIAGNOSIS — Z00.00 ENCOUNTER FOR ANNUAL PHYSICAL EXAM: Primary | ICD-10-CM

## 2025-03-27 LAB
ALBUMIN SERPL BCG-MCNC: 4 G/DL (ref 3.5–5.2)
ALP SERPL-CCNC: 75 U/L (ref 40–150)
ALT SERPL W P-5'-P-CCNC: 27 U/L (ref 0–70)
ANION GAP SERPL CALCULATED.3IONS-SCNC: 9 MMOL/L (ref 7–15)
AST SERPL W P-5'-P-CCNC: 27 U/L (ref 0–45)
BASOPHILS # BLD AUTO: 0.1 10E3/UL (ref 0–0.2)
BASOPHILS NFR BLD AUTO: 1 %
BILIRUB DIRECT SERPL-MCNC: 0.17 MG/DL (ref 0–0.3)
BILIRUB SERPL-MCNC: 0.6 MG/DL
BUN SERPL-MCNC: 17.3 MG/DL (ref 6–20)
CALCIUM SERPL-MCNC: 9.6 MG/DL (ref 8.8–10.4)
CHLORIDE SERPL-SCNC: 103 MMOL/L (ref 98–107)
CHOLEST SERPL-MCNC: 200 MG/DL
CREAT SERPL-MCNC: 0.94 MG/DL (ref 0.67–1.17)
EGFRCR SERPLBLD CKD-EPI 2021: >90 ML/MIN/1.73M2
EOSINOPHIL # BLD AUTO: 0.2 10E3/UL (ref 0–0.7)
EOSINOPHIL NFR BLD AUTO: 3 %
ERYTHROCYTE [DISTWIDTH] IN BLOOD BY AUTOMATED COUNT: 11.8 % (ref 10–15)
EST. AVERAGE GLUCOSE BLD GHB EST-MCNC: 111 MG/DL
FASTING STATUS PATIENT QL REPORTED: YES
FASTING STATUS PATIENT QL REPORTED: YES
GLUCOSE SERPL-MCNC: 105 MG/DL (ref 70–99)
HBA1C MFR BLD: 5.5 % (ref 0–5.6)
HCO3 SERPL-SCNC: 26 MMOL/L (ref 22–29)
HCT VFR BLD AUTO: 46.7 % (ref 40–53)
HDLC SERPL-MCNC: 52 MG/DL
HGB BLD-MCNC: 16.6 G/DL (ref 13.3–17.7)
IMM GRANULOCYTES # BLD: 0 10E3/UL
IMM GRANULOCYTES NFR BLD: 1 %
LDLC SERPL CALC-MCNC: 121 MG/DL
LYMPHOCYTES # BLD AUTO: 1.6 10E3/UL (ref 0.8–5.3)
LYMPHOCYTES NFR BLD AUTO: 28 %
MCH RBC QN AUTO: 33.7 PG (ref 26.5–33)
MCHC RBC AUTO-ENTMCNC: 35.5 G/DL (ref 31.5–36.5)
MCV RBC AUTO: 95 FL (ref 78–100)
MONOCYTES # BLD AUTO: 0.5 10E3/UL (ref 0–1.3)
MONOCYTES NFR BLD AUTO: 9 %
NEUTROPHILS # BLD AUTO: 3.4 10E3/UL (ref 1.6–8.3)
NEUTROPHILS NFR BLD AUTO: 59 %
NONHDLC SERPL-MCNC: 148 MG/DL
PLATELET # BLD AUTO: 310 10E3/UL (ref 150–450)
POTASSIUM SERPL-SCNC: 4.5 MMOL/L (ref 3.4–5.3)
PROT SERPL-MCNC: 7.1 G/DL (ref 6.4–8.3)
PSA SERPL DL<=0.01 NG/ML-MCNC: 1.32 NG/ML (ref 0–3.5)
RBC # BLD AUTO: 4.93 10E6/UL (ref 4.4–5.9)
SODIUM SERPL-SCNC: 138 MMOL/L (ref 135–145)
TRIGL SERPL-MCNC: 135 MG/DL
WBC # BLD AUTO: 5.8 10E3/UL (ref 4–11)

## 2025-03-27 PROCEDURE — 99214 OFFICE O/P EST MOD 30 MIN: CPT | Mod: 25 | Performed by: FAMILY MEDICINE

## 2025-03-27 PROCEDURE — 90714 TD VACC NO PRESV 7 YRS+ IM: CPT | Performed by: FAMILY MEDICINE

## 2025-03-27 PROCEDURE — 36415 COLL VENOUS BLD VENIPUNCTURE: CPT | Performed by: FAMILY MEDICINE

## 2025-03-27 PROCEDURE — 83036 HEMOGLOBIN GLYCOSYLATED A1C: CPT | Performed by: FAMILY MEDICINE

## 2025-03-27 PROCEDURE — 85025 COMPLETE CBC W/AUTO DIFF WBC: CPT | Performed by: FAMILY MEDICINE

## 2025-03-27 PROCEDURE — 80053 COMPREHEN METABOLIC PANEL: CPT | Performed by: FAMILY MEDICINE

## 2025-03-27 PROCEDURE — 82248 BILIRUBIN DIRECT: CPT | Performed by: FAMILY MEDICINE

## 2025-03-27 PROCEDURE — G0103 PSA SCREENING: HCPCS | Performed by: FAMILY MEDICINE

## 2025-03-27 PROCEDURE — 90677 PCV20 VACCINE IM: CPT | Performed by: FAMILY MEDICINE

## 2025-03-27 PROCEDURE — 99396 PREV VISIT EST AGE 40-64: CPT | Mod: 25 | Performed by: FAMILY MEDICINE

## 2025-03-27 PROCEDURE — 80061 LIPID PANEL: CPT | Performed by: FAMILY MEDICINE

## 2025-03-27 PROCEDURE — 90471 IMMUNIZATION ADMIN: CPT | Performed by: FAMILY MEDICINE

## 2025-03-27 PROCEDURE — 3075F SYST BP GE 130 - 139MM HG: CPT | Performed by: FAMILY MEDICINE

## 2025-03-27 PROCEDURE — 3079F DIAST BP 80-89 MM HG: CPT | Performed by: FAMILY MEDICINE

## 2025-03-27 PROCEDURE — 90472 IMMUNIZATION ADMIN EACH ADD: CPT | Performed by: FAMILY MEDICINE

## 2025-03-27 RX ORDER — LISINOPRIL 10 MG/1
10 TABLET ORAL DAILY
Qty: 90 TABLET | Refills: 3 | Status: SHIPPED | OUTPATIENT
Start: 2025-03-27

## 2025-03-27 NOTE — PATIENT INSTRUCTIONS
-Thank you for choosing the [facility].  -It was a pleasure to see you today.  -Please take a look at the information below for more specific details regarding the treatment plan and recommendations.  -In this after visit summary is a list of your medications and specific instructions.  Please review this carefully as there may be changes made to your medication list.  -If there are any particular questions or concerns, please feel free to reach out to Dr. Cooley.  -If any labs have been completed, we will reach out to you about results.  If the results are normal or not concerning, a letter or MyChart message will be sent to you.  If any follow-up is needed, either Dr. Cooley or the nurse will give you a call.  If you have not heard regarding results after 2 weeks, please reach out to the clinic.    -Dr. Cooley will be leaving the [facility] in May 2025. It has been a pleasure to be a part of your care team.  Thank you for trusting me with your health care. Dr. Cooley recommends that you schedule an appointment to establish care with a new doctor at the clinic.     Patient Instructions:    -You are doing great.  -Continue to eat well.  Try to increase your servings of calcium as this can help your bones stay strong and healthy.  Follow a nutrition plan rich in fruits and vegetables and low in fats and cholesterol.    -Be sure to eat 5-7 servings of fruits and vegetables each day.  -Find ways to stay active.  Try to get 150 minutes of moderate activity (where you are breathing faster and slightly sweating) each week.  -Try to maintain a body mass index (BMI) of 18.5-25 as this is considered a healthier weight range.  -Brush your teeth twice daily.  See a dentist every 6-12 months.  -Be sure to use sunblock with SPF 15 or greater when going outside for extended periods of time.  Sunblock should be used even when it is a cloudy day.  Do intermittent skin checks for any concerning skin changes.   Wearing a wide brimmed hat and sunglasses can also be helpful to protect your skin from the sun.  -Monitor for any abnormal skin changes (such as new moles/spots, painful moles, changes in your old moles, wounds that will not heal, multiple colors noted in one lesion, lesions that are asymmetric or not circular, or anything that is concerning for you). If any of these are noted, please schedule an appointment to be seen.     -It is generally recommended for you to complete a health care directive or living will. These documents will be able to reflect your wishes and desire in the case that you are unable to express them yourself. Please let Dr. Cooley know if you would like some assistance with this process.    -Continue taking the lisinopril as prescribed.  -Further recommendations will depend on lab results.    Please seek immediate medical attention (go to the emergency room or urgent care) for the following reasons: worsening symptoms, or any concerning changes.      --------------------------------------------------------------------------------------------------------------------    -We are always looking for ways to improve.  You may be selected to receive a survey regarding your visit today.  We encourage you to complete the survey and provide specific, constructive feedback to help us improve our processes.  Thank you for your time!  -Please review the contact information listed on the after visit summary and in the electronic chart.  Below is the phone number that we have on file.  If there are any changes that are needed to be made, please reach out to the clinic.  [phone] (home)

## 2025-03-27 NOTE — PROGRESS NOTES
Preventive Care Visit  [facility] HARVINDER Cooley MD, Family Medicine  Mar 27, 2025      Assessment & Plan     Patient Instructions:    -You are doing great.  -Continue to eat well.  Try to increase your servings of calcium as this can help your bones stay strong and healthy.  Follow a nutrition plan rich in fruits and vegetables and low in fats and cholesterol.    -Be sure to eat 5-7 servings of fruits and vegetables each day.  -Find ways to stay active.  Try to get 150 minutes of moderate activity (where you are breathing faster and slightly sweating) each week.  -Try to maintain a body mass index (BMI) of 18.5-25 as this is considered a healthier weight range.  -Brush your teeth twice daily.  See a dentist every 6-12 months.  -Be sure to use sunblock with SPF 15 or greater when going outside for extended periods of time.  Sunblock should be used even when it is a cloudy day.  Do intermittent skin checks for any concerning skin changes.  Wearing a wide brimmed hat and sunglasses can also be helpful to protect your skin from the sun.  -Monitor for any abnormal skin changes (such as new moles/spots, painful moles, changes in your old moles, wounds that will not heal, multiple colors noted in one lesion, lesions that are asymmetric or not circular, or anything that is concerning for you). If any of these are noted, please schedule an appointment to be seen.     -It is generally recommended for you to complete a health care directive or living will. These documents will be able to reflect your wishes and desire in the case that you are unable to express them yourself. Please let Dr. Cooley know if you would like some assistance with this process.    -Continue taking the lisinopril as prescribed.  -Further recommendations will depend on lab results.    Please seek immediate medical attention (go to the emergency room or urgent care) for the following reasons: worsening symptoms, or any concerning  "changes.      Dennys was seen today for physical.  Diagnoses and all orders for this visit:    Encounter for annual physical exam  Essential hypertension  Hyperglycemia  Mixed hyperlipidemia: At well.  Check labs as below.  Refills of blood pressure medication given.  -     Hemoglobin A1c; Future  -     Basic metabolic panel; Future  -     CBC with Platelets & Differential; Future  -     Hepatic function panel; Future  -     Lipid panel reflex to direct LDL Fasting; Future  -     lisinopril (ZESTRIL) 10 MG tablet; Take 1 tablet (10 mg) by mouth daily.    Need for vaccination  -     Pneumococcal 20 Valent Conjugate (PCV20)  -     TD,PF 7+(TENIVAC)    Screening for prostate cancer  -     Prostate Specific Antigen Screen; Future        Patient has been advised of split billing requirements and indicates understanding: Yes        BMI  Estimated body mass index is 27.56 kg/m  as calculated from the following:    Height as of this encounter: 1.697 m (5' 6.81\").    Weight as of this encounter: 79.4 kg (175 lb).       Counseling  Appropriate preventive services were addressed with this patient via screening, questionnaire, or discussion as appropriate for fall prevention, nutrition, physical activity, Tobacco-use cessation, social engagement, weight loss and cognition.  Checklist reviewing preventive services available has been given to the patient.  Reviewed patient's diet, addressing concerns and/or questions.   He is at risk for lack of exercise and has been provided with information to increase physical activity for the benefit of his well-being.         Subjective   Dennys is a 54 year old, presenting for the following:  Physical        3/27/2025     8:44 AM   Additional Questions   Roomed by keya GARCIA    Hypertension: Blood pressure is 132/81.  Controlled on lisinopril 10 mg once daily.  Continue medication as prescribed.  No issues or side effects. Check labs. Usually in the 120's at home.     Has history of " hyperlipidemia and hyperglycemia.  Due for labs today.         -Reviewed healthy eating and exercise.He is doing good with getting fruits and vegetables. He still needs to work more on physical activity. He is on a long term computer intensive project. His weight has stayed consistent.  -Skin cancer screening: No concerning skin changes expressed by patient. Right ankle: purple red spot. Doesn't bother him. Father and sister: varicose veins needing treatment.  On exam, patient looks like he has varicose veins.  Discussed diagnosis and monitoring recommendations.    -Smoking status:   Tobacco Use      Smoking status: Never        Passive exposure: Never      Smokeless tobacco: Never        -Family history:   Family History   Problem Relation Age of Onset    Dementia Mother     Hypertension Mother     Benign prostatic hyperplasia Father         Treated for the enlarged prostate    Heart Disease Maternal Grandfather     Breast Cancer Paternal Grandmother     Heart Disease Paternal Grandfather     Breast Cancer Maternal Aunt        Preventative health recommendations, evaluation options, and risk/benefits of each were discussed with patient. Accepted recommendations were ordered. Otherwise, patient declined.  Health Maintenance Due   Topic Date Due    ANNUAL REVIEW OF HM ORDERS  Never done    Pneumococcal Vaccine: 50+ Years (1 of 1 - PCV) Never done    DTAP/TDAP/TD IMMUNIZATION (2 - Td or Tdap) 06/30/2024    BMP  03/04/2025    YEARLY PREVENTIVE VISIT  03/04/2025       -Immunizations due were reviewed.    Immunization History   Administered Date(s) Administered    COVID-19 12+ (Pfizer) 10/12/2023, 10/25/2024    COVID-19 Bivalent 12+ (Pfizer) 11/18/2022    COVID-19 MONOVALENT 12+ (Pfizer) 04/07/2021, 04/28/2021    COVID-19 Monovalent 12+ (Pfizer 2022) 07/05/2022    COVID-19 Monovalent 18+ (Moderna) 12/03/2021    COVID-19 Vaccine, Unspecified 10/12/2023    Flu, Unspecified 01/05/2016, 10/12/2021, 10/11/2023    Hep B, Adult  (Heplisav- B) 04/05/2024, 05/17/2024    Influenza (IIV3) PF 12/30/2013, 10/12/2021    Influenza Vaccine >6 months,quad, PF 10/23/2020, 11/18/2022, 10/11/2023    Influenza Vaccine, 6+MO IM (QUADRIVALENT W/PRESERVATIVES) 01/05/2016, 09/17/2019    Influenza, Split Virus, Trivalent, Pf (Fluzone\Fluarix) 10/25/2024    TD,PF 7+ (Tenivac) 08/22/2005    TDAP (Adacel,Boostrix) 06/30/2014    Zoster recombinant adjuvanted (Shingrix) 04/05/2024, 06/28/2024       -Labs: Laboratory recommendations reviewed with patient.    -Colon cancer screening: Last colonoscopy: never.   REY([employer])  Order: [id number]  Collected 3/16/2024  9:50 AM       Status: Final result    0 Result Notes       1 HM Topic         Component  Ref Range & Units 1 yr ago    REY-ABSTRACT  Negative Negative   Comment:              Advance Care Planning  Patient does not have a Health Care Directive: Discussed advance care planning with patient; information given to patient to review.      3/25/2025   General Health   How would you rate your overall physical health? Good   Feel stress (tense, anxious, or unable to sleep) Only a little   (!) STRESS CONCERN      3/25/2025   Nutrition   Three or more servings of calcium each day? Yes   Diet: Regular (no restrictions)   How many servings of fruit and vegetables per day? (!) 2-3   How many sweetened beverages each day? 0-1         3/25/2025   Exercise   Days per week of moderate/strenous exercise 2 days   Average minutes spent exercising at this level 10 min   (!) EXERCISE CONCERN      3/25/2025   Social Factors   Frequency of gathering with friends or relatives Once a week   Worry food won't last until get money to buy more No   Food not last or not have enough money for food? No   Do you have housing? (Housing is defined as stable permanent housing and does not include staying ouside in a car, in a tent, in an abandoned building, in an overnight shelter, or couch-surfing.) Yes   Are you worried  about losing your housing? No   Lack of transportation? No   Unable to get utilities (heat,electricity)? No         3/25/2025   Fall Risk   Fallen 2 or more times in the past year? No   Trouble with walking or balance? No          3/25/2025   Dental   Dentist two times every year? Yes           3/1/2024   TB Screening   Were you born outside of the [place]? No           Today's PHQ-2 Score:       3/27/2025     8:33 AM   PHQ-2 ( 1999 Pfizer)   Q1: Little interest or pleasure in doing things 0   Q2: Feeling down, depressed or hopeless 0   PHQ-2 Score 0    Q1: Little interest or pleasure in doing things Not at all   Q2: Feeling down, depressed or hopeless Not at all   PHQ-2 Score 0       Patient-reported           3/25/2025   Substance Use   Alcohol more than 3/day or more than 7/wk No   Do you use any other substances recreationally? No     Social History     Tobacco Use    Smoking status: Never     Passive exposure: Never    Smokeless tobacco: Never   Vaping Use    Vaping status: Never Used   Substance Use Topics    Alcohol use: Yes     Comment: Alcoholic Drinks/day: moderate    Drug use: No           3/25/2025   STI Screening   New sexual partner(s) since last STI/HIV test? No   Last PSA:   Prostate Specific Antigen Screen   Date Value Ref Range Status   03/04/2024 1.19 0.00 - 3.50 ng/mL Final   09/17/2019 0.5 0.0 - 3.5 ng/mL Final     ASCVD Risk   The 10-year ASCVD risk score (Tigre DUEÑAS, et al., 2019) is: 7.3%    Values used to calculate the score:      Age: 54 years      Sex: Male      Is Non- : No      Diabetic: No      Tobacco smoker: No      Systolic Blood Pressure: 132 mmHg      Is BP treated: Yes      HDL Cholesterol: 57 mg/dL      Total Cholesterol: 215 mg/dL      Reviewed and updated as needed this visit by Provider                    Past Medical History:   Diagnosis Date    Hypertension      Past Surgical History:   Procedure Laterality Date    LEG SURGERY      For a Fracture  "   OPEN REDUCTION INTERNAL FIXATION FOOT Right 03/28/2022    Procedure: OPEN REDUCTION INTERNAL FIXATION, fifth metatarsal right foot;  Surgeon: Donavon Ferrer DPM;  Location: [facility] OR       The 10 point review of system was negative unless otherwise stated in the HPI.       Objective    Exam  /81   Pulse 65   Temp 98.2  F (36.8  C) (Oral)   Resp 14   Ht 1.697 m (5' 6.81\")   Wt 79.4 kg (175 lb)   SpO2 99%   BMI 27.56 kg/m     Estimated body mass index is 27.56 kg/m  as calculated from the following:    Height as of this encounter: 1.697 m (5' 6.81\").    Weight as of this encounter: 79.4 kg (175 lb).    Physical Exam  GENERAL: alert and no distress  EYES: Eyes grossly normal to inspection, PERRL and conjunctivae and sclerae normal  HENT: ear canals and TM's normal, nose and mouth without ulcers or lesions  NECK: no adenopathy, no asymmetry, masses, or scars  RESP: lungs clear to auscultation - no rales, rhonchi or wheezes  CV: regular rate and rhythm, normal S1 S2, no S3 or S4, no murmur, click or rub, no peripheral edema  ABDOMEN: soft, nontender, no hepatosplenomegaly, no masses and bowel sounds normal  MS: no gross musculoskeletal defects noted, no edema  SKIN: Left varicose veins on the posterior right lower leg. Blanches to palpation.  Otherwise, no suspicious lesions or rashes  NEURO: Normal strength and tone, mentation intact and speech normal  PSYCH: mentation appears normal, affect normal/bright    Rectal: Normal sphincter tone.  Prostate is firm without any nodules, bogginess, tenderness to palpation.  No perianal/rectal masses or abnormalities noted.  Prostate is 45cc in size (unchanged overall).         Prior to immunization administration, verified patients identity using patient s name and date of birth. Please see Immunization Activity for additional information.     Screening Questionnaire for Adult Immunization    Are you sick today?   No   Do you have allergies to " medications, food, a vaccine component or latex?   No   Have you ever had a serious reaction after receiving a vaccination?   No   Do you have a long-term health problem with heart, lung, kidney, or metabolic disease (e.g., diabetes), asthma, a blood disorder, no spleen, complement component deficiency, a cochlear implant, or a spinal fluid leak?  Are you on long-term aspirin therapy?   No   Do you have cancer, leukemia, HIV/AIDS, or any other immune system problem?   No   Do you have a parent, brother, or sister with an immune system problem?   No   In the past 3 months, have you taken medications that affect  your immune system, such as prednisone, other steroids, or anticancer drugs; drugs for the treatment of rheumatoid arthritis, Crohn s disease, or psoriasis; or have you had radiation treatments?   No   Have you had a seizure, or a brain or other nervous system problem?   No   During the past year, have you received a transfusion of blood or blood    products, or been given immune (gamma) globulin or antiviral drug?   No   For women: Are you pregnant or is there a chance you could become       pregnant during the next month?   No   Have you received any vaccinations in the past 4 weeks?   No     Immunization questionnaire answers were all negative.      Patient instructed to remain in clinic for 15 minutes afterwards, and to report any adverse reactions.     Screening performed by Ayde Mchugh MA on 3/27/2025 at 8:48 AM.       Signed Electronically by:     Darrian Cooley MD  [facility] [place] [address]  Phone: [phone]  Fax: [phone]    4/1/2025  11:20 AM

## 2025-04-01 PROBLEM — R73.9 HYPERGLYCEMIA: Status: ACTIVE | Noted: 2025-04-01

## 2025-04-01 PROBLEM — E78.2 MIXED HYPERLIPIDEMIA: Status: ACTIVE | Noted: 2025-04-01
